# Patient Record
Sex: MALE | Race: WHITE | NOT HISPANIC OR LATINO | URBAN - METROPOLITAN AREA
[De-identification: names, ages, dates, MRNs, and addresses within clinical notes are randomized per-mention and may not be internally consistent; named-entity substitution may affect disease eponyms.]

---

## 2016-04-05 LAB
LEFT VENTRICULAR EJECTION FRACTION HIGH VALUE: 50 %
LEFT VENTRICULAR EJECTION FRACTION MODE: NORMAL
LV EF: 45 %

## 2018-01-29 PROBLEM — J41.1 MUCOPURULENT CHRONIC BRONCHITIS (HCC): Chronic | Status: ACTIVE | Noted: 2018-01-29

## 2018-10-29 ENCOUNTER — APPOINTMENT (RX ONLY)
Dept: URBAN - METROPOLITAN AREA CLINIC 23 | Facility: CLINIC | Age: 73
Setting detail: DERMATOLOGY
End: 2018-10-29

## 2018-10-29 DIAGNOSIS — L21.8 OTHER SEBORRHEIC DERMATITIS: ICD-10-CM

## 2018-10-29 DIAGNOSIS — D18.0 HEMANGIOMA: ICD-10-CM

## 2018-10-29 DIAGNOSIS — D485 NEOPLASM OF UNCERTAIN BEHAVIOR OF SKIN: ICD-10-CM

## 2018-10-29 DIAGNOSIS — L57.0 ACTINIC KERATOSIS: ICD-10-CM

## 2018-10-29 DIAGNOSIS — Z85.828 PERSONAL HISTORY OF OTHER MALIGNANT NEOPLASM OF SKIN: ICD-10-CM

## 2018-10-29 DIAGNOSIS — D69.2 OTHER NONTHROMBOCYTOPENIC PURPURA: ICD-10-CM

## 2018-10-29 PROBLEM — L85.3 XEROSIS CUTIS: Status: ACTIVE | Noted: 2018-10-29

## 2018-10-29 PROBLEM — D48.5 NEOPLASM OF UNCERTAIN BEHAVIOR OF SKIN: Status: ACTIVE | Noted: 2018-10-29

## 2018-10-29 PROBLEM — D18.01 HEMANGIOMA OF SKIN AND SUBCUTANEOUS TISSUE: Status: ACTIVE | Noted: 2018-10-29

## 2018-10-29 PROCEDURE — ? COUNSELING

## 2018-10-29 PROCEDURE — ? DIAGNOSIS COMMENT

## 2018-10-29 PROCEDURE — 99203 OFFICE O/P NEW LOW 30 MIN: CPT | Mod: 25

## 2018-10-29 PROCEDURE — ? OTHER

## 2018-10-29 PROCEDURE — 11100: CPT

## 2018-10-29 PROCEDURE — ? PRESCRIPTION

## 2018-10-29 PROCEDURE — ? BIOPSY BY SHAVE METHOD

## 2018-10-29 RX ORDER — PHARMACY COMPOUNDING ACCESSORY
EACH MISCELLANEOUS
Qty: 1 | Refills: 2 | Status: ERX | COMMUNITY
Start: 2018-10-29

## 2018-10-29 RX ORDER — KETOCONAZOLE 20.5 MG/ML
SHAMPOO, SUSPENSION TOPICAL
Qty: 1 | Refills: 3 | Status: ERX | COMMUNITY
Start: 2018-10-29

## 2018-10-29 RX ORDER — FLUOCINOLONE ACETONIDE 0.11 MG/ML
OIL TOPICAL
Qty: 1 | Refills: 11 | Status: ERX | COMMUNITY
Start: 2018-10-29

## 2018-10-29 RX ADMIN — Medication: at 00:00

## 2018-10-29 RX ADMIN — KETOCONAZOLE: 20.5 SHAMPOO, SUSPENSION TOPICAL at 00:00

## 2018-10-29 RX ADMIN — FLUOCINOLONE ACETONIDE: 0.11 OIL TOPICAL at 00:00

## 2018-10-29 ASSESSMENT — LOCATION DETAILED DESCRIPTION DERM
LOCATION DETAILED: SUPERIOR MID FOREHEAD
LOCATION DETAILED: LEFT LATERAL ABDOMEN
LOCATION DETAILED: RIGHT CENTRAL MALAR CHEEK
LOCATION DETAILED: NASAL DORSUM
LOCATION DETAILED: LEFT SUPERIOR MEDIAL FOREHEAD
LOCATION DETAILED: LEFT MEDIAL MALAR CHEEK
LOCATION DETAILED: LEFT SUPERIOR LATERAL BUCCAL CHEEK
LOCATION DETAILED: LEFT ULNAR DORSAL HAND
LOCATION DETAILED: LEFT SUPERIOR POSTERIOR HELIX
LOCATION DETAILED: LEFT MID-UPPER BACK
LOCATION DETAILED: RIGHT DORSAL WRIST

## 2018-10-29 ASSESSMENT — LOCATION ZONE DERM
LOCATION ZONE: NOSE
LOCATION ZONE: ARM
LOCATION ZONE: FACE
LOCATION ZONE: TRUNK
LOCATION ZONE: EAR
LOCATION ZONE: HAND

## 2018-10-29 ASSESSMENT — LOCATION SIMPLE DESCRIPTION DERM
LOCATION SIMPLE: NOSE
LOCATION SIMPLE: LEFT EAR
LOCATION SIMPLE: LEFT HAND
LOCATION SIMPLE: ABDOMEN
LOCATION SIMPLE: LEFT CHEEK
LOCATION SIMPLE: RIGHT CHEEK
LOCATION SIMPLE: SUPERIOR FOREHEAD
LOCATION SIMPLE: RIGHT WRIST
LOCATION SIMPLE: LEFT UPPER BACK
LOCATION SIMPLE: LEFT FOREHEAD

## 2018-10-29 NOTE — PROCEDURE: BIOPSY BY SHAVE METHOD
Dressing: bandage
Biopsy Type: H and E
Destruction After The Procedure: No
Was A Bandage Applied: Yes
Notification Instructions: Patient will be notified of biopsy results. However, patient instructed to call the office if not contacted within 2 weeks.
Path Notes (To The Dermatopathologist): .
Silver Nitrate Text: The wound bed was treated with silver nitrate after the biopsy was performed.
Billing Type: Third-Party Bill
Curettage Text: The wound bed was treated with curettage after the biopsy was performed.
Depth Of Biopsy: dermis
Hemostasis: Aluminum Chloride
Type Of Destruction Used: Curettage
Electrodesiccation Text: The wound bed was treated with electrodesiccation after the biopsy was performed.
Additional Anesthesia Volume In Cc (Will Not Render If 0): 0
Consent: Written consent was obtained and risks were reviewed including but not limited to scarring, infection, bleeding, scabbing, incomplete removal, nerve damage and allergy to anesthesia.
Wound Care: Vaseline
Cryotherapy Text: The wound bed was treated with cryotherapy after the biopsy was performed.
Electrodesiccation And Curettage Text: The wound bed was treated with electrodesiccation and curettage after the biopsy was performed.
Anesthesia Type: 1% lidocaine with epinephrine
Post-Care Instructions: I reviewed with the patient in detail post-care instructions. Patient is to keep the biopsy site dry overnight, and then apply bacitracin twice daily until healed. Patient may apply hydrogen peroxide soaks to remove any crusting.
Anesthesia Volume In Cc: 0.5
Biopsy Method: Dermablade
Detail Level: Detailed
Path Notes Override (Will Replace All Of The Above Text): Specimen(s) sent to [facility] for testing

## 2018-10-29 NOTE — PROCEDURE: DIAGNOSIS COMMENT
Detail Level: Simple
Comment: Pt is to continue ketoconazole given previously. Will address this next visit after treating aks with Efudex/dovonex.

## 2018-10-29 NOTE — PROCEDURE: OTHER
Note Text (......Xxx Chief Complaint.): This diagnosis correlates with the
Detail Level: Zone
Other (Free Text): Pt has several [facility] treated in the past ( [place])
Other (Free Text): Efudex/dovonex sent to [facility]

## 2018-12-20 ENCOUNTER — APPOINTMENT (RX ONLY)
Dept: URBAN - METROPOLITAN AREA CLINIC 24 | Facility: CLINIC | Age: 73
Setting detail: DERMATOLOGY
End: 2018-12-20

## 2018-12-20 PROBLEM — C44.319 BASAL CELL CARCINOMA OF SKIN OF OTHER PARTS OF FACE: Status: ACTIVE | Noted: 2018-12-20

## 2018-12-20 PROCEDURE — ? MOHS SURGERY

## 2018-12-20 PROCEDURE — 17311 MOHS 1 STAGE H/N/HF/G: CPT

## 2018-12-20 PROCEDURE — 17312 MOHS ADDL STAGE: CPT

## 2018-12-20 PROCEDURE — 12052 INTMD RPR FACE/MM 2.6-5.0 CM: CPT

## 2018-12-20 NOTE — PROCEDURE: MOHS SURGERY
Tumor Debulked?: curette
Crescentic Intermediate Repair Preamble Text (Leave Blank If You Do Not Want): Undermining was performed with blunt dissection.
Bi-Rhombic Flap Text: The defect edges were debeveled with a #15 scalpel blade.  Given the location of the defect and the proximity to free margins a bi-rhombic flap was deemed most appropriate.  Using a sterile surgical marker, an appropriate rhombic flap was drawn incorporating the defect. The area thus outlined was incised deep to adipose tissue with a #15 scalpel blade.  The skin margins were undermined to an appropriate distance in all directions utilizing iris scissors.
Mastoid Interpolation Flap Text: A decision was made to reconstruct the defect utilizing an interpolation axial flap and a staged reconstruction.  A telfa template was made of the defect.  This telfa template was then used to outline the mastoid interpolation flap.  The donor area for the pedicle flap was then injected with anesthesia.  The flap was excised through the skin and subcutaneous tissue down to the layer of the underlying musculature.  The pedicle flap was carefully excised within this deep plane to maintain its blood supply.  The edges of the donor site were undermined.   The donor site was closed in a primary fashion.  The pedicle was then rotated into position and sutured.  Once the tube was sutured into place, adequate blood supply was confirmed with blanching and refill.  The pedicle was then wrapped with xeroform gauze and dressed appropriately with a telfa and gauze bandage to ensure continued blood supply and protect the attached pedicle.
Advancement-Rotation Flap Text: The defect edges were debeveled with a #15 scalpel blade.  Given the location of the defect, shape of the defect and the proximity to free margins an advancement-rotation flap was deemed most appropriate.  Using a sterile surgical marker, an appropriate flap was drawn incorporating the defect and placing the expected incisions within the relaxed skin tension lines where possible. The area thus outlined was incised deep to adipose tissue with a #15 scalpel blade.  The skin margins were undermined to an appropriate distance in all directions utilizing iris scissors.
Quadrant Reporting?: no
Stage 12: Additional Anesthesia Volume In Cc: 0
Composite Graft Text: The defect edges were debeveled with a #15 scalpel blade.  Given the location of the defect, shape of the defect, the proximity to free margins and the fact the defect was full thickness a composite graft was deemed most appropriate.  The defect was outline and then transferred to the donor site.  A full thickness graft was then excised from the donor site. The graft was then placed in the primary defect, oriented appropriately and then sutured into place.  The secondary defect was then repaired using a primary closure.
Show Biopsy Photo Reviewed Variable: Yes
Inflammation Suggestive Of Cancer Camouflage Histology Text: There was a dense lymphocytic infiltrate which prevented adequate histologic evaluation of adjacent structures.
Oculoplastic Surgeon Procedure Text (A): After obtaining clear surgical margins the patient was sent to oculoplastics for surgical repair.  The patient understands they will receive post-surgical care and follow-up from the referring physician's office.
Complex Repair Preamble Text (Leave Blank If You Do Not Want): Extensive wide undermining was performed.
Manual Repair Warning Statement: We plan on removing the manually selected variable below in favor of our much easier automatic structured text blocks found in the previous tab. We decided to do this to help make the flow better and give you the full power of structured data. Manual selection is never going to be ideal in our platform and I would encourage you to avoid using manual selection from this point on, especially since I will be sunsetting this feature. It is important that you do one of two things with the customized text below. First, you can save all of the text in a word file so you can have it for future reference. Second, transfer the text to the appropriate area in the Library tab. Lastly, if there is a flap or graft type which we do not have you need to let us know right away so I can add it in before the variable is hidden. No need to panic, we plan to give you roughly 6 months to make the change.
Subsequent Stages Histo Method Verbiage: Using a similar technique to that described above, a thin layer of tissue was removed from all areas where tumor was visible on the previous stage.  The tissue was again oriented, mapped, dyed, and processed as above.
Paramedian Forehead Flap Text: A decision was made to reconstruct the defect utilizing an interpolation axial flap and a staged reconstruction.  A telfa template was made of the defect.  This telfa template was then used to outline the paramedian forehead pedicle flap.  The donor area for the pedicle flap was then injected with anesthesia.  The flap was excised through the skin and subcutaneous tissue down to the layer of the underlying musculature.  The pedicle flap was carefully excised within this deep plane to maintain its blood supply.  The edges of the donor site were undermined.   The donor site was closed in a primary fashion.  The pedicle was then rotated into position and sutured.  Once the tube was sutured into place, adequate blood supply was confirmed with blanching and refill.  The pedicle was then wrapped with xeroform gauze and dressed appropriately with a telfa and gauze bandage to ensure continued blood supply and protect the attached pedicle.
Mid-Level Procedure Text (E): After obtaining clear surgical margins the patient was sent to a mid-level provider for surgical repair.  The patient understands they will receive post-surgical care and follow-up from the mid-level provider.
Referred To Plastics For Closure Text (Leave Blank If You Do Not Want): After obtaining clear surgical margins the patient was sent to plastics for surgical repair.  The patient understands they will receive post-surgical care and follow-up from the referring physician's office.
Helical Rim Advancement Flap Text: The defect edges were debeveled with a #15 blade scalpel.  Given the location of the defect and the proximity to free margins (helical rim) a double helical rim advancement flap was deemed most appropriate.  Using a sterile surgical marker, the appropriate advancement flaps were drawn incorporating the defect and placing the expected incisions between the helical rim and antihelix where possible.  The area thus outlined was incised through and through with a #15 scalpel blade.  With a skin hook and iris scissors, the flaps were gently and sharply undermined and freed up.
Unna Boot Text: An Unna boot was placed to help immobilize the limb and facilitate more rapid healing.
Banner Transposition Flap Text: The defect edges were debeveled with a #15 scalpel blade.  Given the location of the defect and the proximity to free margins a Banner transposition flap was deemed most appropriate.  Using a sterile surgical marker, an appropriate flap drawn around the defect. The area thus outlined was incised deep to adipose tissue with a #15 scalpel blade.  The skin margins were undermined to an appropriate distance in all directions utilizing iris scissors.
Repair Performed By Another Provider Text (Leave Blank If You Do Not Want): After obtaining clear surgical margins the defect was repaired by another provider.
Epidermal Sutures: 6-0 Prolene
Z Plasty Text: The lesion was extirpated to the level of the fat with a #15 scalpel blade.  Given the location of the defect, shape of the defect and the proximity to free margins a Z-plasty was deemed most appropriate for repair.  Using a sterile surgical marker, the appropriate transposition arms of the Z-plasty were drawn incorporating the defect and placing the expected incisions within the relaxed skin tension lines where possible.    The area thus outlined was incised deep to adipose tissue with a #15 scalpel blade.  The skin margins were undermined to an appropriate distance in all directions utilizing iris scissors.  The opposing transposition arms were then transposed into place in opposite direction and anchored with interrupted buried subcutaneous sutures.
Split-Thickness Skin Graft Text: The defect edges were debeveled with a #15 scalpel blade.  Given the location of the defect, shape of the defect and the proximity to free margins a split thickness skin graft was deemed most appropriate.  Using a sterile surgical marker, the primary defect shape was transferred to the donor site. The split thickness graft was then harvested.  The skin graft was then placed in the primary defect and oriented appropriately.
Purse String (Simple) Text: Given the location of the defect and the characteristics of the surrounding skin a pursestring closure was deemed most appropriate.  Undermining was performed circumfirentially around the surgical defect.  A purstring suture was then placed and tightened.
Referred To Asc For Closure Text (Leave Blank If You Do Not Want): After obtaining clear surgical margins the patient was sent to an ASC for surgical repair.  The patient understands they will receive post-surgical care and follow-up from the ASC physician.
Wound Care (No Sutures): Petrolatum
Consent (Lip)/Introductory Paragraph: The rationale for Mohs was explained to the patient and consent was obtained. The risks, benefits and alternatives to therapy were discussed in detail. Specifically, the risks of lip deformity, changes in the oral aperture, infection, scarring, bleeding, prolonged wound healing, incomplete removal, allergy to anesthesia, nerve injury and recurrence were addressed. Prior to the procedure, the treatment site was clearly identified and confirmed by the patient. All components of Universal Protocol/PAUSE Rule completed.
Simple / Intermediate / Complex Repair - Final Wound Length In Cm: 3.4
Melolabial Transposition Flap Text: The defect edges were debeveled with a #15 scalpel blade.  Given the location of the defect and the proximity to free margins a melolabial flap was deemed most appropriate.  Using a sterile surgical marker, an appropriate melolabial transposition flap was drawn incorporating the defect.    The area thus outlined was incised deep to adipose tissue with a #15 scalpel blade.  The skin margins were undermined to an appropriate distance in all directions utilizing iris scissors.
Melolabial Interpolation Flap Text: A decision was made to reconstruct the defect utilizing an interpolation axial flap and a staged reconstruction.  A telfa template was made of the defect.  This telfa template was then used to outline the melolabial interpolation flap.  The donor area for the pedicle flap was then injected with anesthesia.  The flap was excised through the skin and subcutaneous tissue down to the layer of the underlying musculature.  The pedicle flap was carefully excised within this deep plane to maintain its blood supply.  The edges of the donor site were undermined.   The donor site was closed in a primary fashion.  The pedicle was then rotated into position and sutured.  Once the tube was sutured into place, adequate blood supply was confirmed with blanching and refill.  The pedicle was then wrapped with xeroform gauze and dressed appropriately with a telfa and gauze bandage to ensure continued blood supply and protect the attached pedicle.
Complex Repair And Flap Additional Text (Will Appearing After The Standard Complex Repair Text): The complex repair was not sufficient to completely close the primary defect. The remaining additional defect was repaired with the flap mentioned below.
Stage 9: Additional Anesthesia Type: 1% lidocaine with epinephrine
Burow's Advancement Flap Text: The defect edges were debeveled with a #15 scalpel blade.  Given the location of the defect and the proximity to free margins a Burow's advancement flap was deemed most appropriate.  Using a sterile surgical marker, the appropriate advancement flap was drawn incorporating the defect and placing the expected incisions within the relaxed skin tension lines where possible.    The area thus outlined was incised deep to adipose tissue with a #15 scalpel blade.  The skin margins were undermined to an appropriate distance in all directions utilizing iris scissors.
Home Suture Removal Text: Patient was provided instructions on removing sutures and will remove their sutures at home.  If they have any questions or difficulties they will call the office.
Detail Level: Detailed
Anesthesia Volume In Cc: 4
A-T Advancement Flap Text: The defect edges were debeveled with a #15 scalpel blade.  Given the location of the defect, shape of the defect and the proximity to free margins an A-T advancement flap was deemed most appropriate.  Using a sterile surgical marker, an appropriate advancement flap was drawn incorporating the defect and placing the expected incisions within the relaxed skin tension lines where possible.    The area thus outlined was incised deep to adipose tissue with a #15 scalpel blade.  The skin margins were undermined to an appropriate distance in all directions utilizing iris scissors.
Closure 4 Information: This tab is for additional flaps and grafts above and beyond our usual structured repairs.  Please note if you enter information here it will not currently bill and you will need to add the billing information manually.
No Residual Tumor Seen Histology Text: There were no malignant cells seen in the sections examined.
Purse String (Intermediate) Text: Given the location of the defect and the characteristics of the surrounding skin a pursestring intermediate closure was deemed most appropriate.  Undermining was performed circumfirentially around the surgical defect.  A purstring suture was then placed and tightened.
Anesthesia Volume In Cc: 2
Star Wedge Flap Text: The defect edges were debeveled with a #15 scalpel blade.  Given the location of the defect, shape of the defect and the proximity to free margins a star wedge flap was deemed most appropriate.  Using a sterile surgical marker, an appropriate rotation flap was drawn incorporating the defect and placing the expected incisions within the relaxed skin tension lines where possible. The area thus outlined was incised deep to adipose tissue with a #15 scalpel blade.  The skin margins were undermined to an appropriate distance in all directions utilizing iris scissors.
Otolaryngologist Procedure Text (E): After obtaining clear surgical margins the patient was sent to otolaryngology for surgical repair.  The patient understands they will receive post-surgical care and follow-up from the referring physician's office.
Ear Star Wedge Flap Text: The defect edges were debeveled with a #15 blade scalpel.  Given the location of the defect and the proximity to free margins (helical rim) an ear star wedge flap was deemed most appropriate.  Using a sterile surgical marker, the appropriate flap was drawn incorporating the defect and placing the expected incisions between the helical rim and antihelix where possible.  The area thus outlined was incised through and through with a #15 scalpel blade.
Repair Anesthesia Method: local infiltration
Transposition Flap Text: The defect edges were debeveled with a #15 scalpel blade.  Given the location of the defect and the proximity to free margins a transposition flap was deemed most appropriate.  Using a sterile surgical marker, an appropriate transposition flap was drawn incorporating the defect.    The area thus outlined was incised deep to adipose tissue with a #15 scalpel blade.  The skin margins were undermined to an appropriate distance in all directions utilizing iris scissors.
Keystone Flap Text: The defect edges were debeveled with a #15 scalpel blade.  Given the location of the defect, shape of the defect a keystone flap was deemed most appropriate.  Using a sterile surgical marker, an appropriate keystone flap was drawn incorporating the defect, outlining the appropriate donor tissue and placing the expected incisions within the relaxed skin tension lines where possible. The area thus outlined was incised deep to adipose tissue with a #15 scalpel blade.  The skin margins were undermined to an appropriate distance in all directions around the primary defect and laterally outward around the flap utilizing iris scissors.
No Repair - Repaired With Adjacent Surgical Defect Text (Leave Blank If You Do Not Want): After obtaining clear surgical margins the defect was repaired concurrently with another surgical defect which was in close approximation.
Mucosal Advancement Flap Text: Given the location of the defect, shape of the defect and the proximity to free margins a mucosal advancement flap was deemed most appropriate. Incisions were made with a 15 blade scalpel in the appropriate fashion along the cutaneous vermillion border and the mucosal lip. The remaining actinically damaged mucosal tissue was excised.  The mucosal advancement flap was then elevated to the gingival sulcus with care taken to preserve the neurovascular structures and advanced into the primary defect. Care was taken to ensure that precise realignment of the vermillion border was achieved.
Advancement Flap (Single) Text: The defect edges were debeveled with a #15 scalpel blade.  Given the location of the defect and the proximity to free margins a single advancement flap was deemed most appropriate.  Using a sterile surgical marker, an appropriate advancement flap was drawn incorporating the defect and placing the expected incisions within the relaxed skin tension lines where possible.    The area thus outlined was incised deep to adipose tissue with a #15 scalpel blade.  The skin margins were undermined to an appropriate distance in all directions utilizing iris scissors.
Donor Site Anesthesia Type: same as repair anesthesia
V-Y Flap Text: The defect edges were debeveled with a #15 scalpel blade.  Given the location of the defect, shape of the defect and the proximity to free margins a V-Y flap was deemed most appropriate.  Using a sterile surgical marker, an appropriate advancement flap was drawn incorporating the defect and placing the expected incisions within the relaxed skin tension lines where possible.    The area thus outlined was incised deep to adipose tissue with a #15 scalpel blade.  The skin margins were undermined to an appropriate distance in all directions utilizing iris scissors.
Cheiloplasty (Complex) Text: A decision was made to reconstruct the defect with a  cheiloplasty.  The defect was undermined extensively.  Additional obicularis oris muscle was excised with a 15 blade scalpel.  The defect was converted into a full thickness wedge to facilite a better cosmetic result.  Small vessels were then tied off with 5-0 monocyrl. The obicularis oris, superficial fascia, adipose and dermis were then reapproximated.  After the deeper layers were approximated the epidermis was reapproximated with particular care given to realign the vermillion border.
Graft Donor Site Dermal Sutures (Optional): 5-0 PDS
Consent 3/Introductory Paragraph: I gave the patient a chance to ask questions they had about the procedure.  Following this I explained the Mohs procedure and consent was obtained. The risks, benefits and alternatives to therapy were discussed in detail. Specifically, the risks of infection, scarring, bleeding, prolonged wound healing, incomplete removal, allergy to anesthesia, nerve injury and recurrence were addressed. Prior to the procedure, the treatment site was clearly identified and confirmed by the patient. All components of Universal Protocol/PAUSE Rule completed.
Xenograft Text: The defect edges were debeveled with a #15 scalpel blade.  Given the location of the defect, shape of the defect and the proximity to free margins a xenograft was deemed most appropriate.  The graft was then trimmed to fit the size of the defect.  The graft was then placed in the primary defect and oriented appropriately.
Cheek Interpolation Flap Text: A decision was made to reconstruct the defect utilizing an interpolation axial flap and a staged reconstruction.  A telfa template was made of the defect.  This telfa template was then used to outline the Cheek Interpolation flap.  The donor area for the pedicle flap was then injected with anesthesia.  The flap was excised through the skin and subcutaneous tissue down to the layer of the underlying musculature.  The interpolation flap was carefully excised within this deep plane to maintain its blood supply.  The edges of the donor site were undermined.   The donor site was closed in a primary fashion.  The pedicle was then rotated into position and sutured.  Once the tube was sutured into place, adequate blood supply was confirmed with blanching and refill.  The pedicle was then wrapped with xeroform gauze and dressed appropriately with a telfa and gauze bandage to ensure continued blood supply and protect the attached pedicle.
Mauc Instructions: By selecting yes to the question below the MAUC number will be added into the note.  This will be calculated automatically based on the diagnosis chosen, the size entered, the body zone selected (H,M,L) and the specific indications you chose. You will also have the option to override the Mohs AUC if you disagree with the automatically calculated number and this option is found in the Case Summary tab.
O-T Plasty Text: The defect edges were debeveled with a #15 scalpel blade.  Given the location of the defect, shape of the defect and the proximity to free margins an O-T plasty was deemed most appropriate.  Using a sterile surgical marker, an appropriate O-T plasty was drawn incorporating the defect and placing the expected incisions within the relaxed skin tension lines where possible.    The area thus outlined was incised deep to adipose tissue with a #15 scalpel blade.  The skin margins were undermined to an appropriate distance in all directions utilizing iris scissors.
V-Y Plasty Text: The defect edges were debeveled with a #15 scalpel blade.  Given the location of the defect, shape of the defect and the proximity to free margins an V-Y advancement flap was deemed most appropriate.  Using a sterile surgical marker, an appropriate advancement flap was drawn incorporating the defect and placing the expected incisions within the relaxed skin tension lines where possible.    The area thus outlined was incised deep to adipose tissue with a #15 scalpel blade.  The skin margins were undermined to an appropriate distance in all directions utilizing iris scissors.
Epidermal Autograft Text: The defect edges were debeveled with a #15 scalpel blade.  Given the location of the defect, shape of the defect and the proximity to free margins an epidermal autograft was deemed most appropriate.  Using a sterile surgical marker, the primary defect shape was transferred to the donor site. The epidermal graft was then harvested.  The skin graft was then placed in the primary defect and oriented appropriately.
Eye Protection Verbiage: Before proceeding with the stage, a plastic scleral shield was inserted. The globe was anesthetized with a few drops of 1% lidocaine with 1:100,000 epinephrine. Then, an appropriate sized scleral shield was chosen and coated with lacrilube ointment. The shield was gently inserted and left in place for the duration of each stage. After the stage was completed, the shield was gently removed.
Consent (Ear)/Introductory Paragraph: The rationale for Mohs was explained to the patient and consent was obtained. The risks, benefits and alternatives to therapy were discussed in detail. Specifically, the risks of ear deformity, infection, scarring, bleeding, prolonged wound healing, incomplete removal, allergy to anesthesia, nerve injury and recurrence were addressed. Prior to the procedure, the treatment site was clearly identified and confirmed by the patient. All components of Universal Protocol/PAUSE Rule completed.
Spiral Flap Text: The defect edges were debeveled with a #15 scalpel blade.  Given the location of the defect, shape of the defect and the proximity to free margins a spiral flap was deemed most appropriate.  Using a sterile surgical marker, an appropriate rotation flap was drawn incorporating the defect and placing the expected incisions within the relaxed skin tension lines where possible. The area thus outlined was incised deep to adipose tissue with a #15 scalpel blade.  The skin margins were undermined to an appropriate distance in all directions utilizing iris scissors.
Posterior Auricular Interpolation Flap Text: A decision was made to reconstruct the defect utilizing an interpolation axial flap and a staged reconstruction.  A telfa template was made of the defect.  This telfa template was then used to outline the posterior auricular interpolation flap.  The donor area for the pedicle flap was then injected with anesthesia.  The flap was excised through the skin and subcutaneous tissue down to the layer of the underlying musculature.  The pedicle flap was carefully excised within this deep plane to maintain its blood supply.  The edges of the donor site were undermined.   The donor site was closed in a primary fashion.  The pedicle was then rotated into position and sutured.  Once the tube was sutured into place, adequate blood supply was confirmed with blanching and refill.  The pedicle was then wrapped with xeroform gauze and dressed appropriately with a telfa and gauze bandage to ensure continued blood supply and protect the attached pedicle.
Bcc Infiltrative Histology Text: There were numerous aggregates of basaloid cells demonstrating an infiltrative pattern.
Anesthesia Type: 1% lidocaine without epinephrine
Mohs Method Verbiage: An incision at a 45 degree angle following the standard Mohs approach was done and the specimen was harvested as a microscopic controlled layer.
Consent (Marginal Mandibular)/Introductory Paragraph: The rationale for Mohs was explained to the patient and consent was obtained. The risks, benefits and alternatives to therapy were discussed in detail. Specifically, the risks of damage to the marginal mandibular branch of the facial nerve, infection, scarring, bleeding, prolonged wound healing, incomplete removal, allergy to anesthesia, and recurrence were addressed. Prior to the procedure, the treatment site was clearly identified and confirmed by the patient. All components of Universal Protocol/PAUSE Rule completed.
S Plasty Text: Given the location and shape of the defect, and the orientation of relaxed skin tension lines, an S-plasty was deemed most appropriate for repair.  Using a sterile surgical marker, the appropriate outline of the S-plasty was drawn, incorporating the defect and placing the expected incisions within the relaxed skin tension lines where possible.  The area thus outlined was incised deep to adipose tissue with a #15 scalpel blade.  The skin margins were undermined to an appropriate distance in all directions utilizing iris scissors. The skin flaps were advanced over the defect.  The opposing margins were then approximated with interrupted buried subcutaneous sutures.
Alar Island Pedicle Flap Text: The defect edges were debeveled with a #15 scalpel blade.  Given the location of the defect, shape of the defect and the proximity to the alar rim an island pedicle advancement flap was deemed most appropriate.  Using a sterile surgical marker, an appropriate advancement flap was drawn incorporating the defect, outlining the appropriate donor tissue and placing the expected incisions within the nasal ala running parallel to the alar rim. The area thus outlined was incised with a #15 scalpel blade.  The skin margins were undermined minimally to an appropriate distance in all directions around the primary defect and laterally outward around the island pedicle utilizing iris scissors.  There was minimal undermining beneath the pedicle flap.
Surgeon Performing Repair (Optional): Dr Mcfarlane
Non-Graft Cartilage Fenestration Text: The cartilage was fenestrated with a 2mm punch biopsy to help facilitate healing.
Tarsorrhaphy Text: A tarsorrhaphy was performed using Frost sutures.
Postop Diagnosis: same
Dorsal Nasal Flap Text: The defect edges were debeveled with a #15 scalpel blade.  Given the location of the defect and the proximity to free margins a dorsal nasal flap was deemed most appropriate.  Using a sterile surgical marker, an appropriate dorsal nasal flap was drawn around the defect.    The area thus outlined was incised deep to adipose tissue with a #15 scalpel blade.  The skin margins were undermined to an appropriate distance in all directions utilizing iris scissors.
Cartilage Graft Text: The defect edges were debeveled with a #15 scalpel blade.  Given the location of the defect, shape of the defect, the fact the defect involved a full thickness cartilage defect a cartilage graft was deemed most appropriate.  An appropriate donor site was identified, cleansed, and anesthetized. The cartilage graft was then harvested and transferred to the recipient site, oriented appropriately and then sutured into place.  The secondary defect was then repaired using a primary closure.
Alternatives Discussed Intro (Do Not Add Period): I discussed alternative treatments to Mohs surgery and specifically discussed the risks and benefits of
Initial Size Of Lesion: 0.5
Full Thickness Lip Wedge Repair (Flap) Text: Given the location of the defect and the proximity to free margins a full thickness wedge repair was deemed most appropriate.  Using a sterile surgical marker, the appropriate repair was drawn incorporating the defect and placing the expected incisions perpendicular to the vermillion border.  The vermillion border was also meticulously outlined to ensure appropriate reapproximation during the repair.  The area thus outlined was incised through and through with a #15 scalpel blade.  The muscularis and dermis were reaproximated with deep sutures following hemostasis. Care was taken to realign the vermillion border before proceeding with the superficial closure.  Once the vermillion was realigned the superfical and mucosal closure was finished.
Island Pedicle Flap With Canthal Suspension Text: The defect edges were debeveled with a #15 scalpel blade.  Given the location of the defect, shape of the defect and the proximity to free margins an island pedicle advancement flap was deemed most appropriate.  Using a sterile surgical marker, an appropriate advancement flap was drawn incorporating the defect, outlining the appropriate donor tissue and placing the expected incisions within the relaxed skin tension lines where possible. The area thus outlined was incised deep to adipose tissue with a #15 scalpel blade.  The skin margins were undermined to an appropriate distance in all directions around the primary defect and laterally outward around the island pedicle utilizing iris scissors.  There was minimal undermining beneath the pedicle flap. A suspension suture was placed in the canthal tendon to prevent tension and prevent ectropion.
Cheiloplasty (Less Than 50%) Text: A decision was made to reconstruct the defect with a  cheiloplasty.  The defect was undermined extensively.  Additional obicularis oris muscle was excised with a 15 blade scalpel.  The defect was converted into a full thickness wedge, of less than 50% of the vertical height of the lip, to facilite a better cosmetic result.  Small vessels were then tied off with 5-0 monocyrl. The obicularis oris, superficial fascia, adipose and dermis were then reapproximated.  After the deeper layers were approximated the epidermis was reapproximated with particular care given to realign the vermillion border.
Consent (Spinal Accessory)/Introductory Paragraph: The rationale for Mohs was explained to the patient and consent was obtained. The risks, benefits and alternatives to therapy were discussed in detail. Specifically, the risks of damage to the spinal accessory nerve, infection, scarring, bleeding, prolonged wound healing, incomplete removal, allergy to anesthesia, and recurrence were addressed. Prior to the procedure, the treatment site was clearly identified and confirmed by the patient. All components of Universal Protocol/PAUSE Rule completed.
Location Indication Override (Is Already Calculated Based On Selected Body Location): Area M
Dressing: dry sterile dressing
Anesthesia Type: 1% lidocaine with 1:200,000 epinephrine and a 1:10 solution of 8.4% sodium bicarbonate
Same Histology In Subsequent Stages Text: The pattern and morphology of the tumor is as described in the first stage.
Rotation Flap Text: The defect edges were debeveled with a #15 scalpel blade.  Given the location of the defect, shape of the defect and the proximity to free margins a rotation flap was deemed most appropriate.  Using a sterile surgical marker, an appropriate rotation flap was drawn incorporating the defect and placing the expected incisions within the relaxed skin tension lines where possible.    The area thus outlined was incised deep to adipose tissue with a #15 scalpel blade.  The skin margins were undermined to an appropriate distance in all directions utilizing iris scissors.
Consent (Scalp)/Introductory Paragraph: The rationale for Mohs was explained to the patient and consent was obtained. The risks, benefits and alternatives to therapy were discussed in detail. Specifically, the risks of changes in hair growth pattern secondary to repair, infection, scarring, bleeding, prolonged wound healing, incomplete removal, allergy to anesthesia, nerve injury and recurrence were addressed. Prior to the procedure, the treatment site was clearly identified and confirmed by the patient. All components of Universal Protocol/PAUSE Rule completed.
Surgeon/Pathologist Verbiage (Will Incorporate Name Of Surgeon From Intro If Not Blank): operated in two distinct and integrated capacities as the surgeon and pathologist.
Trilobed Flap Text: The defect edges were debeveled with a #15 scalpel blade.  Given the location of the defect and the proximity to free margins a trilobed flap was deemed most appropriate.  Using a sterile surgical marker, an appropriate trilobed flap drawn around the defect.    The area thus outlined was incised deep to adipose tissue with a #15 scalpel blade.  The skin margins were undermined to an appropriate distance in all directions utilizing iris scissors.
Rhombic Flap Text: The defect edges were debeveled with a #15 scalpel blade.  Given the location of the defect and the proximity to free margins a rhombic flap was deemed most appropriate.  Using a sterile surgical marker, an appropriate rhombic flap was drawn incorporating the defect.    The area thus outlined was incised deep to adipose tissue with a #15 scalpel blade.  The skin margins were undermined to an appropriate distance in all directions utilizing iris scissors.
Stage 2: Additional Anesthesia Type: 1% lidocaine with 1:100,000 epinephrine
Partial Purse String (Intermediate) Text: Given the location of the defect and the characteristics of the surrounding skin an intermediate purse string closure was deemed most appropriate.  Undermining was performed circumfirentially around the surgical defect.  A purse string suture was then placed and tightened. Wound tension only allowed a partial closure of the circular defect.
Surgeon: Gerry baron
Area H Indication Text: Tumors in this location are included in Area H (eyelids, eyebrows, nose, lips, chin, ear, pre-auricular, post-auricular, temple, genitalia, hands, feet, ankles and areola).  Tissue conservation is critical in these anatomic locations.
Bilateral Helical Rim Advancement Flap Text: The defect edges were debeveled with a #15 blade scalpel.  Given the location of the defect and the proximity to free margins (helical rim) a bilateral helical rim advancement flap was deemed most appropriate.  Using a sterile surgical marker, the appropriate advancement flaps were drawn incorporating the defect and placing the expected incisions between the helical rim and antihelix where possible.  The area thus outlined was incised through and through with a #15 scalpel blade.  With a skin hook and iris scissors, the flaps were gently and sharply undermined and freed up.
Ftsg Text: The defect edges were debeveled with a #15 scalpel blade.  Given the location of the defect, shape of the defect and the proximity to free margins a full thickness skin graft was deemed most appropriate.  Using a sterile surgical marker, the primary defect shape was transferred to the donor site. The area thus outlined was incised deep to adipose tissue with a #15 scalpel blade.  The harvested graft was then trimmed of adipose tissue until only dermis and epidermis was left.  The skin margins of the secondary defect were undermined to an appropriate distance in all directions utilizing iris scissors.  The secondary defect was closed with interrupted buried subcutaneous sutures.  The skin edges were then re-apposed with running  sutures.  The skin graft was then placed in the primary defect and oriented appropriately.
Bcc Histology Text: There were numerous aggregates of basaloid cells.
Complex Repair And Graft Additional Text (Will Appearing After The Standard Complex Repair Text): The complex repair was not sufficient to completely close the primary defect. The remaining additional defect was repaired with the graft mentioned below.
Partial Purse String (Simple) Text: Given the location of the defect and the characteristics of the surrounding skin a simple purse string closure was deemed most appropriate.  Undermining was performed circumfirentially around the surgical defect.  A purse string suture was then placed and tightened. Wound tension only allowed a partial closure of the circular defect.
Skin Substitute Text: The defect edges were debeveled with a #15 scalpel blade.  Given the location of the defect, shape of the defect and the proximity to free margins a skin substitute graft was deemed most appropriate.  The graft material was trimmed to fit the size of the defect. The graft was then placed in the primary defect and oriented appropriately.
Area L Indication Text: Tumors in this location are included in Area L (trunk and extremities).  Mohs surgery is indicated for larger tumors, 2 cm or larger, in these anatomic locations.
Post-Care Instructions: I reviewed with the patient in detail post-care instructions. Patient is not to engage in any heavy lifting, exercise, or swimming for the next 14 days. Should the patient develop any fevers, chills, bleeding, severe pain patient will contact the office immediately..\\n.
Hemostasis: Electrocautery
Information: Selecting Yes will display possible errors in your note based on the variables you have selected. This validation is only offered as a suggestion for you. PLEASE NOTE THAT THE VALIDATION TEXT WILL BE REMOVED WHEN YOU FINALIZE YOUR NOTE. IF YOU WANT TO FAX A PRELIMINARY NOTE YOU WILL NEED TO TOGGLE THIS TO 'NO' IF YOU DO NOT WANT IT IN YOUR FAXED NOTE.
Dermal Autograft Text: The defect edges were debeveled with a #15 scalpel blade.  Given the location of the defect, shape of the defect and the proximity to free margins a dermal autograft was deemed most appropriate.  Using a sterile surgical marker, the primary defect shape was transferred to the donor site. The area thus outlined was incised deep to adipose tissue with a #15 scalpel blade.  The harvested graft was then trimmed of adipose and epidermal tissue until only dermis was left.  The skin graft was then placed in the primary defect and oriented appropriately.
Medical Necessity Statement: Based on my medical judgement, Mohs surgery is the most appropriate treatment for this cancer compared to other treatments.
W Plasty Text: The lesion was extirpated to the level of the fat with a #15 scalpel blade.  Given the location of the defect, shape of the defect and the proximity to free margins a W-plasty was deemed most appropriate for repair.  Using a sterile surgical marker, the appropriate transposition arms of the W-plasty were drawn incorporating the defect and placing the expected incisions within the relaxed skin tension lines where possible.    The area thus outlined was incised deep to adipose tissue with a #15 scalpel blade.  The skin margins were undermined to an appropriate distance in all directions utilizing iris scissors.  The opposing transposition arms were then transposed into place in opposite direction and anchored with interrupted buried subcutaneous sutures.
Modified Advancement Flap Text: The defect edges were debeveled with a #15 scalpel blade.  Given the location of the defect, shape of the defect and the proximity to free margins a modified advancement flap was deemed most appropriate.  Using a sterile surgical marker, an appropriate advancement flap was drawn incorporating the defect and placing the expected incisions within the relaxed skin tension lines where possible.    The area thus outlined was incised deep to adipose tissue with a #15 scalpel blade.  The skin margins were undermined to an appropriate distance in all directions utilizing iris scissors.
Consent (Temporal Branch)/Introductory Paragraph: The rationale for Mohs was explained to the patient and consent was obtained. The risks, benefits and alternatives to therapy were discussed in detail. Specifically, the risks of damage to the temporal branch of the facial nerve, infection, scarring, bleeding, prolonged wound healing, incomplete removal, allergy to anesthesia, and recurrence were addressed. Prior to the procedure, the treatment site was clearly identified and confirmed by the patient. All components of Universal Protocol/PAUSE Rule completed.
Mohs Case Number: [id number]
Muscle Hinge Flap Text: The defect edges were debeveled with a #15 scalpel blade.  Given the size, depth and location of the defect and the proximity to free margins a muscle hinge flap was deemed most appropriate.  Using a sterile surgical marker, an appropriate hinge flap was drawn incorporating the defect. The area thus outlined was incised with a #15 scalpel blade.  The skin margins were undermined to an appropriate distance in all directions utilizing iris scissors.
H Plasty Text: Given the location of the defect, shape of the defect and the proximity to free margins a H-plasty was deemed most appropriate for repair.  Using a sterile surgical marker, the appropriate advancement arms of the H-plasty were drawn incorporating the defect and placing the expected incisions within the relaxed skin tension lines where possible. The area thus outlined was incised deep to adipose tissue with a #15 scalpel blade. The skin margins were undermined to an appropriate distance in all directions utilizing iris scissors.  The opposing advancement arms were then advanced into place in opposite direction and anchored with interrupted buried subcutaneous sutures.
Consent (Near Eyelid Margin)/Introductory Paragraph: The rationale for Mohs was explained to the patient and consent was obtained. The risks, benefits and alternatives to therapy were discussed in detail. Specifically, the risks of ectropion or eyelid deformity, infection, scarring, bleeding, prolonged wound healing, incomplete removal, allergy to anesthesia, nerve injury and recurrence were addressed. Prior to the procedure, the treatment site was clearly identified and confirmed by the patient. All components of Universal Protocol/PAUSE Rule completed.
Mohs Histo Method Verbiage: The section(s) were then chromacoded and processed in the Mohs lab using the Mohs protocol and submitted for frozen section.
Double Island Pedicle Flap Text: The defect edges were debeveled with a #15 scalpel blade.  Given the location of the defect, shape of the defect and the proximity to free margins a double island pedicle advancement flap was deemed most appropriate.  Using a sterile surgical marker, an appropriate advancement flap was drawn incorporating the defect, outlining the appropriate donor tissue and placing the expected incisions within the relaxed skin tension lines where possible.    The area thus outlined was incised deep to adipose tissue with a #15 scalpel blade.  The skin margins were undermined to an appropriate distance in all directions around the primary defect and laterally outward around the island pedicle utilizing iris scissors.  There was minimal undermining beneath the pedicle flap.
Closure 2 Information: This tab is for additional flaps and grafts, including complex repair and grafts and complex repair and flaps. You can also specify a different location for the additional defect, if the location is the same you do not need to select a new one. We will insert the automated text for the repair you select below just as we do for solitary flaps and grafts. Please note that at this time if you select a location with a different insurance zone you will need to override the ICD10 and CPT if appropriate.
Consent Type: Consent 1 (Standard)
Consent 1/Introductory Paragraph: The rationale for Mohs was explained to the patient and consent was obtained. The risks, benefits and alternatives to therapy were discussed in detail. Specifically, the risks of infection, scarring, bleeding, prolonged wound healing, incomplete removal, allergy to anesthesia, nerve injury and recurrence were addressed. Prior to the procedure, the treatment site was clearly identified and confirmed by the patient. All components of Universal Protocol/PAUSE Rule completed.
Island Pedicle Flap-Requiring Vessel Identification Text: The defect edges were debeveled with a #15 scalpel blade.  Given the location of the defect, shape of the defect and the proximity to free margins an island pedicle advancement flap was deemed most appropriate.  Using a sterile surgical marker, an appropriate advancement flap was drawn, based on the axial vessel mentioned above, incorporating the defect, outlining the appropriate donor tissue and placing the expected incisions within the relaxed skin tension lines where possible.    The area thus outlined was incised deep to adipose tissue with a #15 scalpel blade.  The skin margins were undermined to an appropriate distance in all directions around the primary defect and laterally outward around the island pedicle utilizing iris scissors.  There was minimal undermining beneath the pedicle flap.
Advancement Flap (Double) Text: The defect edges were debeveled with a #15 scalpel blade.  Given the location of the defect and the proximity to free margins a double advancement flap was deemed most appropriate.  Using a sterile surgical marker, the appropriate advancement flaps were drawn incorporating the defect and placing the expected incisions within the relaxed skin tension lines where possible.    The area thus outlined was incised deep to adipose tissue with a #15 scalpel blade.  The skin margins were undermined to an appropriate distance in all directions utilizing iris scissors.
O-T Advancement Flap Text: The defect edges were debeveled with a #15 scalpel blade.  Given the location of the defect, shape of the defect and the proximity to free margins an O-T advancement flap was deemed most appropriate.  Using a sterile surgical marker, an appropriate advancement flap was drawn incorporating the defect and placing the expected incisions within the relaxed skin tension lines where possible.    The area thus outlined was incised deep to adipose tissue with a #15 scalpel blade.  The skin margins were undermined to an appropriate distance in all directions utilizing iris scissors.
Mercedes Flap Text: The defect edges were debeveled with a #15 scalpel blade.  Given the location of the defect, shape of the defect and the proximity to free margins a Mercedes flap was deemed most appropriate.  Using a sterile surgical marker, an appropriate advancement flap was drawn incorporating the defect and placing the expected incisions within the relaxed skin tension lines where possible. The area thus outlined was incised deep to adipose tissue with a #15 scalpel blade.  The skin margins were undermined to an appropriate distance in all directions utilizing iris scissors.
Repair Type: Intermediate Layered Repair
Area M Indication Text: Tumors in this location are included in Area M (cheek, forehead, scalp, neck, jawline and pretibial skin).  Mohs surgery is indicated for tumors 1 cm or larger in these anatomic locations.
Mohs Rapid Report Verbiage: The area of clinically evident tumor was marked with skin marking ink and appropriately hatched.  The initial incision was made following the Mohs approach through the skin.  The specimen was taken to the lab, divided into the necessary number of pieces, chromacoded and processed according to the Mohs protocol.  This was repeated in successive stages until a tumor free defect was achieved.
O-L Flap Text: The defect edges were debeveled with a #15 scalpel blade.  Given the location of the defect, shape of the defect and the proximity to free margins an O-L flap was deemed most appropriate.  Using a sterile surgical marker, an appropriate advancement flap was drawn incorporating the defect and placing the expected incisions within the relaxed skin tension lines where possible.    The area thus outlined was incised deep to adipose tissue with a #15 scalpel blade.  The skin margins were undermined to an appropriate distance in all directions utilizing iris scissors.
Island Pedicle Flap Text: The defect edges were debeveled with a #15 scalpel blade.  Given the location of the defect, shape of the defect and the proximity to free margins an island pedicle advancement flap was deemed most appropriate.  Using a sterile surgical marker, an appropriate advancement flap was drawn incorporating the defect, outlining the appropriate donor tissue and placing the expected incisions within the relaxed skin tension lines where possible.    The area thus outlined was incised deep to adipose tissue with a #15 scalpel blade.  The skin margins were undermined to an appropriate distance in all directions around the primary defect and laterally outward around the island pedicle utilizing iris scissors.  There was minimal undermining beneath the pedicle flap.
Stage 3: Additional Anesthesia Type: 0.5% lidocaine with 1:200,000 epinephrine and a 1:10 solution of 8.4% sodium bicarbonate
Secondary Intention Text (Leave Blank If You Do Not Want): The defect will heal with secondary intention.
Estimated Blood Loss (Cc): minimal
Ear Wedge Repair Text: A wedge excision was completed by carrying down an excision through the full thickness of the ear and cartilage with an inward facing Burow's triangle. The wound was then closed in a layered fashion.
Consent 2/Introductory Paragraph: Mohs surgery was explained to the patient and consent was obtained. The risks, benefits and alternatives to therapy were discussed in detail. Specifically, the risks of infection, scarring, bleeding, prolonged wound healing, incomplete removal, allergy to anesthesia, nerve injury and recurrence were addressed. Prior to the procedure, the treatment site was clearly identified and confirmed by the patient. All components of Universal Protocol/PAUSE Rule completed.
O-Z Plasty Text: The defect edges were debeveled with a #15 scalpel blade.  Given the location of the defect, shape of the defect and the proximity to free margins an O-Z plasty (double transposition flap) was deemed most appropriate.  Using a sterile surgical marker, the appropriate transposition flaps were drawn incorporating the defect and placing the expected incisions within the relaxed skin tension lines where possible.    The area thus outlined was incised deep to adipose tissue with a #15 scalpel blade.  The skin margins were undermined to an appropriate distance in all directions utilizing iris scissors.  Hemostasis was achieved with electrocautery.  The flaps were then transposed into place, one clockwise and the other counterclockwise, and anchored with interrupted buried subcutaneous sutures.
Localized Dermabrasion With Wire Brush Text: The patient was draped in routine manner.  Localized dermabrasion using 3 x 17 mm wire brush was performed in routine manner to papillary dermis. This spot dermabrasion is being performed to complete skin cancer reconstruction. It also will eliminate the other sun damaged precancerous cells that are known to be part of the regional effect of a lifetime's worth of sun exposure. This localized dermabrasion is therapeutic and should not be considered cosmetic in any regard.
Graft Donor Site Bandage (Optional-Leave Blank If You Don't Want In Note): Steri-strips and a pressure bandage were applied to the donor site.
Wound Care: Bacitracin
Cheek-To-Nose Interpolation Flap Text: A decision was made to reconstruct the defect utilizing an interpolation axial flap and a staged reconstruction.  A telfa template was made of the defect.  This telfa template was then used to outline the Cheek-To-Nose Interpolation flap.  The donor area for the pedicle flap was then injected with anesthesia.  The flap was excised through the skin and subcutaneous tissue down to the layer of the underlying musculature.  The interpolation flap was carefully excised within this deep plane to maintain its blood supply.  The edges of the donor site were undermined.   The donor site was closed in a primary fashion.  The pedicle was then rotated into position and sutured.  Once the tube was sutured into place, adequate blood supply was confirmed with blanching and refill.  The pedicle was then wrapped with xeroform gauze and dressed appropriately with a telfa and gauze bandage to ensure continued blood supply and protect the attached pedicle.
Interpolation Flap Text: A decision was made to reconstruct the defect utilizing an interpolation axial flap and a staged reconstruction.  A telfa template was made of the defect.  This telfa template was then used to outline the interpolation flap.  The donor area for the pedicle flap was then injected with anesthesia.  The flap was excised through the skin and subcutaneous tissue down to the layer of the underlying musculature.  The interpolation flap was carefully excised within this deep plane to maintain its blood supply.  The edges of the donor site were undermined.   The donor site was closed in a primary fashion.  The pedicle was then rotated into position and sutured.  Once the tube was sutured into place, adequate blood supply was confirmed with blanching and refill.  The pedicle was then wrapped with xeroform gauze and dressed appropriately with a telfa and gauze bandage to ensure continued blood supply and protect the attached pedicle.
Deep Sutures: 5-0 PolySyn
Tissue Cultured Epidermal Autograft Text: The defect edges were debeveled with a #15 scalpel blade.  Given the location of the defect, shape of the defect and the proximity to free margins a tissue cultured epidermal autograft was deemed most appropriate.  The graft was then trimmed to fit the size of the defect.  The graft was then placed in the primary defect and oriented appropriately.
Graft Cartilage Fenestration Text: The cartilage was fenestrated with a 2mm punch biopsy to help facilitate graft survival and healing.
Bilobed Transposition Flap Text: The defect edges were debeveled with a #15 scalpel blade.  Given the location of the defect and the proximity to free margins a bilobed transposition flap was deemed most appropriate.  Using a sterile surgical marker, an appropriate bilobe flap drawn around the defect.    The area thus outlined was incised deep to adipose tissue with a #15 scalpel blade.  The skin margins were undermined to an appropriate distance in all directions utilizing iris scissors.
Crescentic Advancement Flap Text: The defect edges were debeveled with a #15 scalpel blade.  Given the location of the defect and the proximity to free margins a crescentic advancement flap was deemed most appropriate.  Using a sterile surgical marker, the appropriate advancement flap was drawn incorporating the defect and placing the expected incisions within the relaxed skin tension lines where possible.    The area thus outlined was incised deep to adipose tissue with a #15 scalpel blade.  The skin margins were undermined to an appropriate distance in all directions utilizing iris scissors.
Bilobed Flap Text: The defect edges were debeveled with a #15 scalpel blade.  Given the location of the defect and the proximity to free margins a bilobe flap was deemed most appropriate.  Using a sterile surgical marker, an appropriate bilobe flap drawn around the defect.    The area thus outlined was incised deep to adipose tissue with a #15 scalpel blade.  The skin margins were undermined to an appropriate distance in all directions utilizing iris scissors.
Hatchet Flap Text: The defect edges were debeveled with a #15 scalpel blade.  Given the location of the defect, shape of the defect and the proximity to free margins a hatchet flap was deemed most appropriate.  Using a sterile surgical marker, an appropriate hatchet flap was drawn incorporating the defect and placing the expected incisions within the relaxed skin tension lines where possible.    The area thus outlined was incised deep to adipose tissue with a #15 scalpel blade.  The skin margins were undermined to an appropriate distance in all directions utilizing iris scissors.
Epidermal Closure: running and interrupted
Consent (Nose)/Introductory Paragraph: The rationale for Mohs was explained to the patient and consent was obtained. The risks, benefits and alternatives to therapy were discussed in detail. Specifically, the risks of nasal deformity, changes in the flow of air through the nose, infection, scarring, bleeding, prolonged wound healing, incomplete removal, allergy to anesthesia, nerve injury and recurrence were addressed. Prior to the procedure, the treatment site was clearly identified and confirmed by the patient. All components of Universal Protocol/PAUSE Rule completed.

## 2018-12-28 ENCOUNTER — APPOINTMENT (RX ONLY)
Dept: URBAN - METROPOLITAN AREA CLINIC 23 | Facility: CLINIC | Age: 73
Setting detail: DERMATOLOGY
End: 2018-12-28

## 2018-12-28 DIAGNOSIS — Z48.01 ENCOUNTER FOR CHANGE OR REMOVAL OF SURGICAL WOUND DRESSING: ICD-10-CM

## 2018-12-28 PROCEDURE — ? SUTURE REMOVAL (GLOBAL PERIOD)

## 2018-12-28 ASSESSMENT — LOCATION ZONE DERM: LOCATION ZONE: FACE

## 2018-12-28 ASSESSMENT — LOCATION DETAILED DESCRIPTION DERM: LOCATION DETAILED: LEFT SUPERIOR LATERAL BUCCAL CHEEK

## 2018-12-28 ASSESSMENT — LOCATION SIMPLE DESCRIPTION DERM: LOCATION SIMPLE: LEFT CHEEK

## 2018-12-28 NOTE — PROCEDURE: SUTURE REMOVAL (GLOBAL PERIOD)
Add 84803 Cpt? (Important Note: In 2017 The Use Of 21986 Is Being Tracked By Cms To Determine Future Global Period Reimbursement For Global Periods): no
Detail Level: Simple

## 2019-04-29 ENCOUNTER — APPOINTMENT (RX ONLY)
Dept: URBAN - METROPOLITAN AREA CLINIC 23 | Facility: CLINIC | Age: 74
Setting detail: DERMATOLOGY
End: 2019-04-29

## 2019-04-29 DIAGNOSIS — D22 MELANOCYTIC NEVI: ICD-10-CM

## 2019-04-29 DIAGNOSIS — D485 NEOPLASM OF UNCERTAIN BEHAVIOR OF SKIN: ICD-10-CM

## 2019-04-29 DIAGNOSIS — Z85.828 PERSONAL HISTORY OF OTHER MALIGNANT NEOPLASM OF SKIN: ICD-10-CM

## 2019-04-29 DIAGNOSIS — L21.8 OTHER SEBORRHEIC DERMATITIS: ICD-10-CM

## 2019-04-29 DIAGNOSIS — D18.0 HEMANGIOMA: ICD-10-CM

## 2019-04-29 PROBLEM — D48.5 NEOPLASM OF UNCERTAIN BEHAVIOR OF SKIN: Status: ACTIVE | Noted: 2019-04-29

## 2019-04-29 PROBLEM — F41.9 ANXIETY DISORDER, UNSPECIFIED: Status: ACTIVE | Noted: 2019-04-29

## 2019-04-29 PROBLEM — D18.01 HEMANGIOMA OF SKIN AND SUBCUTANEOUS TISSUE: Status: ACTIVE | Noted: 2019-04-29

## 2019-04-29 PROBLEM — I25.10 ATHEROSCLEROTIC HEART DISEASE OF NATIVE CORONARY ARTERY WITHOUT ANGINA PECTORIS: Status: ACTIVE | Noted: 2019-04-29

## 2019-04-29 PROBLEM — L29.8 OTHER PRURITUS: Status: ACTIVE | Noted: 2019-04-29

## 2019-04-29 PROBLEM — D22.4 MELANOCYTIC NEVI OF SCALP AND NECK: Status: ACTIVE | Noted: 2019-04-29

## 2019-04-29 PROCEDURE — 11102 TANGNTL BX SKIN SINGLE LES: CPT | Mod: 59

## 2019-04-29 PROCEDURE — ? OTHER

## 2019-04-29 PROCEDURE — ? COUNSELING

## 2019-04-29 PROCEDURE — ? BIOPSY BY SHAVE METHOD

## 2019-04-29 PROCEDURE — 99214 OFFICE O/P EST MOD 30 MIN: CPT | Mod: 25

## 2019-04-29 PROCEDURE — ? SHAVE REMOVAL

## 2019-04-29 PROCEDURE — ? PRESCRIPTION

## 2019-04-29 PROCEDURE — 11306 SHAVE SKIN LESION 0.6-1.0 CM: CPT

## 2019-04-29 RX ORDER — KETOCONAZOLE 20 MG/G
CREAM TOPICAL
Qty: 1 | Refills: 11 | Status: ERX | COMMUNITY
Start: 2019-04-29

## 2019-04-29 RX ORDER — FLUOCINOLONE ACETONIDE 0.11 MG/ML
OIL TOPICAL
Qty: 1 | Refills: 11 | Status: ERX

## 2019-04-29 RX ORDER — KETOCONAZOLE 20.5 MG/ML
SHAMPOO, SUSPENSION TOPICAL
Qty: 1 | Refills: 11 | Status: ERX

## 2019-04-29 RX ADMIN — KETOCONAZOLE: 20 CREAM TOPICAL at 13:19

## 2019-04-29 ASSESSMENT — LOCATION SIMPLE DESCRIPTION DERM
LOCATION SIMPLE: RIGHT CHEEK
LOCATION SIMPLE: LEFT CHEEK
LOCATION SIMPLE: LEFT FOREARM
LOCATION SIMPLE: SUPERIOR FOREHEAD
LOCATION SIMPLE: LEFT FOREHEAD
LOCATION SIMPLE: LEFT UPPER BACK
LOCATION SIMPLE: NECK
LOCATION SIMPLE: RIGHT FOREHEAD
LOCATION SIMPLE: NOSE

## 2019-04-29 ASSESSMENT — LOCATION ZONE DERM
LOCATION ZONE: FACE
LOCATION ZONE: NECK
LOCATION ZONE: ARM
LOCATION ZONE: TRUNK
LOCATION ZONE: NOSE

## 2019-04-29 ASSESSMENT — LOCATION DETAILED DESCRIPTION DERM
LOCATION DETAILED: SUPERIOR MID FOREHEAD
LOCATION DETAILED: NASAL DORSUM
LOCATION DETAILED: LEFT PROXIMAL RADIAL DORSAL FOREARM
LOCATION DETAILED: RIGHT CENTRAL MALAR CHEEK
LOCATION DETAILED: RIGHT SUPERIOR LATERAL NECK
LOCATION DETAILED: LEFT SUPERIOR LATERAL BUCCAL CHEEK
LOCATION DETAILED: LEFT MID-UPPER BACK
LOCATION DETAILED: RIGHT FOREHEAD
LOCATION DETAILED: LEFT FOREHEAD
LOCATION DETAILED: LEFT MEDIAL MALAR CHEEK

## 2019-04-29 NOTE — PROCEDURE: SHAVE REMOVAL
Bill For Surgical Tray: no
Post-Care Instructions: I reviewed with the patient in detail post-care instructions. Patient is to keep the biopsy site dry overnight, and then apply Vaseline twice daily until healed. Patient may apply dilute white vinegar (1:10; white vinegar:water) soaks to remove any crusting.
Detail Level: Detailed
Medical Necessity Information: It is in your best interest to select a reason for this procedure from the list below. All of these items fulfill various CMS LCD requirements except the new and changing color options.
Biopsy Method: Dermablade
Size Of Lesion In Cm (Required): 0.8
Billing Type: Third-Party Bill
Path Notes (To The Dermatopathologist): Please check margins.
Was A Bandage Applied: Yes
Consent was obtained from the patient. The risks and benefits to therapy were discussed in detail. Specifically, the risks of infection, scarring, bleeding, prolonged wound healing, incomplete removal, allergy to anesthesia, nerve injury and recurrence were addressed. Prior to the procedure, the treatment site was clearly identified and confirmed by the patient. All components of Universal Protocol/PAUSE Rule completed.
Medical Necessity Clause: This procedure was medically necessary because the lesion that was treated was:
Wound Care: Vaseline
X Size Of Lesion In Cm (Optional): 0
Anesthesia Type: 1% lidocaine with epinephrine
Hemostasis: Aluminum Chloride
Notification Instructions: Patient will be notified of biopsy results. However, patient instructed to call the office if not contacted within 2 weeks.

## 2019-04-29 NOTE — PROCEDURE: BIOPSY BY SHAVE METHOD
Hemostasis: Aluminum Chloride
Curettage Text: The wound bed was treated with curettage after the biopsy was performed.
Render In Bullet Format When Appropriate: No
Was A Bandage Applied: Yes
X Size Of Lesion In Cm: 0
Notification Instructions: Patient will be notified of biopsy results. However, patient instructed to call the office if not contacted within 2 weeks.
Dressing: bandage
Wound Care: Vaseline
Electrodesiccation Text: The wound bed was treated with electrodesiccation after the biopsy was performed.
Biopsy Method: Dermablade
Type Of Destruction Used: Curettage
Anesthesia Volume In Cc: 0.5
Post-Care Instructions: I reviewed with the patient in detail post-care instructions. Patient is to keep the biopsy site dry overnight, and then apply bacitracin twice daily until healed. Patient may apply hydrogen peroxide soaks to remove any crusting.
Cryotherapy Text: The wound bed was treated with cryotherapy after the biopsy was performed.
Depth Of Biopsy: dermis
Silver Nitrate Text: The wound bed was treated with silver nitrate after the biopsy was performed.
Path Notes (To The Dermatopathologist): .
Billing Type: Third-Party Bill
Electrodesiccation And Curettage Text: The wound bed was treated with electrodesiccation and curettage after the biopsy was performed.
Anesthesia Type: 1% lidocaine with epinephrine
Accession #: [id number]
Consent: Written consent was obtained and risks were reviewed including but not limited to scarring, infection, bleeding, scabbing, incomplete removal, nerve damage and allergy to anesthesia.
Biopsy Type: H and E
Detail Level: Detailed

## 2019-04-29 NOTE — PROCEDURE: OTHER
Other (Free Text): Pt has several [facility] treated in the past ( [place])
Note Text (......Xxx Chief Complaint.): This diagnosis correlates with the
Detail Level: Zone

## 2019-05-13 ENCOUNTER — APPOINTMENT (RX ONLY)
Dept: URBAN - METROPOLITAN AREA CLINIC 23 | Facility: CLINIC | Age: 74
Setting detail: DERMATOLOGY
End: 2019-05-13

## 2019-05-13 PROBLEM — C44.699 OTHER SPECIFIED MALIGNANT NEOPLASM OF SKIN OF LEFT UPPER LIMB, INCLUDING SHOULDER: Status: ACTIVE | Noted: 2019-05-13

## 2019-05-13 PROCEDURE — ? EXCISION

## 2019-05-13 PROCEDURE — 13122 CMPLX RPR S/A/L ADDL 5 CM/>: CPT | Mod: 59

## 2019-05-13 PROCEDURE — 13121 CMPLX RPR S/A/L 2.6-7.5 CM: CPT | Mod: 59

## 2019-05-13 PROCEDURE — 11604 EXC TR-EXT MAL+MARG 3.1-4 CM: CPT

## 2019-05-13 PROCEDURE — 15271 SKIN SUB GRAFT TRNK/ARM/LEG: CPT

## 2019-05-13 NOTE — PROCEDURE: EXCISION
Bi-Rhombic Flap Text: The defect edges were debeveled with a #15 scalpel blade.  Given the location of the defect and the proximity to free margins a bi-rhombic flap was deemed most appropriate.  Using a sterile surgical marker, an appropriate rhombic flap was drawn incorporating the defect. The area thus outlined was incised deep to adipose tissue with a #15 scalpel blade.  The skin margins were undermined to an appropriate distance in all directions utilizing iris scissors.
Double M-Plasty Intermediate Repair Preamble Text (Leave Blank If You Do Not Want): Undermining was performed with blunt dissection.
Anesthesia Volume In Cc: 0
Show Pathology Comment Variable: Yes
Path Notes (To The Dermatopathologist): Please check margins.
Melolabial Transposition Flap Text: The defect edges were debeveled with a #15 scalpel blade.  Given the location of the defect and the proximity to free margins a melolabial flap was deemed most appropriate.  Using a sterile surgical marker, an appropriate melolabial transposition flap was drawn incorporating the defect.    The area thus outlined was incised deep to adipose tissue with a #15 scalpel blade.  The skin margins were undermined to an appropriate distance in all directions utilizing iris scissors.
Hemostasis: Electrocautery
Pre-Excision Curettage Text (Leave Blank If You Do Not Want): Prior to drawing the surgical margin the visible lesion was removed with electrodesiccation and curettage to clearly define the lesion size.
Z Plasty Text: The lesion was extirpated to the level of the fat with a #15 scalpel blade.  Given the location of the defect, shape of the defect and the proximity to free margins a Z-plasty was deemed most appropriate for repair.  Using a sterile surgical marker, the appropriate transposition arms of the Z-plasty were drawn incorporating the defect and placing the expected incisions within the relaxed skin tension lines where possible.    The area thus outlined was incised deep to adipose tissue with a #15 scalpel blade.  The skin margins were undermined to an appropriate distance in all directions utilizing iris scissors.  The opposing transposition arms were then transposed into place in opposite direction and anchored with interrupted buried subcutaneous sutures.
Complex Repair And Single Advancement Flap Text: The defect edges were debeveled with a #15 scalpel blade.  The primary defect was closed partially with a complex linear closure.  Given the location of the remaining defect, shape of the defect and the proximity to free margins a single advancement flap was deemed most appropriate for complete closure of the defect.  Using a sterile surgical marker, an appropriate advancement flap was drawn incorporating the defect and placing the expected incisions within the relaxed skin tension lines where possible.    The area thus outlined was incised deep to adipose tissue with a #15 scalpel blade.  The skin margins were undermined to an appropriate distance in all directions utilizing iris scissors.
Complex Repair And Epidermal Autograft Text: The defect edges were debeveled with a #15 scalpel blade.  The primary defect was closed partially with a complex linear closure.  Given the location of the defect, shape of the defect and the proximity to free margins an epidermal autograft was deemed most appropriate to repair the remaining defect.  The graft was trimmed to fit the size of the remaining defect.  The graft was then placed in the primary defect, oriented appropriately, and sutured into place.
Complex Repair And V-Y Plasty Text: The defect edges were debeveled with a #15 scalpel blade.  The primary defect was closed partially with a complex linear closure.  Given the location of the remaining defect, shape of the defect and the proximity to free margins a V-Y plasty was deemed most appropriate for complete closure of the defect.  Using a sterile surgical marker, an appropriate advancement flap was drawn incorporating the defect and placing the expected incisions within the relaxed skin tension lines where possible.    The area thus outlined was incised deep to adipose tissue with a #15 scalpel blade.  The skin margins were undermined to an appropriate distance in all directions utilizing iris scissors.
Wound Care: Petrolatum
Double Island Pedicle Flap Text: The defect edges were debeveled with a #15 scalpel blade.  Given the location of the defect, shape of the defect and the proximity to free margins a double island pedicle advancement flap was deemed most appropriate.  Using a sterile surgical marker, an appropriate advancement flap was drawn incorporating the defect, outlining the appropriate donor tissue and placing the expected incisions within the relaxed skin tension lines where possible.    The area thus outlined was incised deep to adipose tissue with a #15 scalpel blade.  The skin margins were undermined to an appropriate distance in all directions around the primary defect and laterally outward around the island pedicle utilizing iris scissors.  There was minimal undermining beneath the pedicle flap.
Modified Advancement Flap Text: The defect edges were debeveled with a #15 scalpel blade.  Given the location of the defect, shape of the defect and the proximity to free margins a modified advancement flap was deemed most appropriate.  Using a sterile surgical marker, an appropriate advancement flap was drawn incorporating the defect and placing the expected incisions within the relaxed skin tension lines where possible.    The area thus outlined was incised deep to adipose tissue with a #15 scalpel blade.  The skin margins were undermined to an appropriate distance in all directions utilizing iris scissors.
Complex Repair And Tissue Cultured Epidermal Autograft Text: The defect edges were debeveled with a #15 scalpel blade.  The primary defect was closed partially with a complex linear closure.  Given the location of the defect, shape of the defect and the proximity to free margins an tissue cultured epidermal autograft was deemed most appropriate to repair the remaining defect.  The graft was trimmed to fit the size of the remaining defect.  The graft was then placed in the primary defect, oriented appropriately, and sutured into place.
Lip Wedge Excision Repair Text: Given the location of the defect and the proximity to free margins a full thickness wedge repair was deemed most appropriate.  Using a sterile surgical marker, the appropriate repair was drawn incorporating the defect and placing the expected incisions perpendicular to the vermilion border.  The vermilion border was also meticulously outlined to ensure appropriate reapproximation during the repair.  The area thus outlined was incised through and through with a #15 scalpel blade.  The muscularis and dermis were reaproximated with deep sutures following hemostasis. Care was taken to realign the vermilion border before proceeding with the superficial closure.  Once the vermilion was realigned the superfical and mucosal closure was finished.
Complex Repair And O-T Advancement Flap Text: The defect edges were debeveled with a #15 scalpel blade.  The primary defect was closed partially with a complex linear closure.  Given the location of the remaining defect, shape of the defect and the proximity to free margins an O-T advancement flap was deemed most appropriate for complete closure of the defect.  Using a sterile surgical marker, an appropriate advancement flap was drawn incorporating the defect and placing the expected incisions within the relaxed skin tension lines where possible.    The area thus outlined was incised deep to adipose tissue with a #15 scalpel blade.  The skin margins were undermined to an appropriate distance in all directions utilizing iris scissors.
Spiral Flap Text: The defect edges were debeveled with a #15 scalpel blade.  Given the location of the defect, shape of the defect and the proximity to free margins a spiral flap was deemed most appropriate.  Using a sterile surgical marker, an appropriate rotation flap was drawn incorporating the defect and placing the expected incisions within the relaxed skin tension lines where possible. The area thus outlined was incised deep to adipose tissue with a #15 scalpel blade.  The skin margins were undermined to an appropriate distance in all directions utilizing iris scissors.
Excision Method: Fusiform
Interpolation Flap Text: A decision was made to reconstruct the defect utilizing an interpolation axial flap and a staged reconstruction.  A telfa template was made of the defect.  This telfa template was then used to outline the interpolation flap.  The donor area for the pedicle flap was then injected with anesthesia.  The flap was excised through the skin and subcutaneous tissue down to the layer of the underlying musculature.  The interpolation flap was carefully excised within this deep plane to maintain its blood supply.  The edges of the donor site were undermined.   The donor site was closed in a primary fashion.  The pedicle was then rotated into position and sutured.  Once the tube was sutured into place, adequate blood supply was confirmed with blanching and refill.  The pedicle was then wrapped with xeroform gauze and dressed appropriately with a telfa and gauze bandage to ensure continued blood supply and protect the attached pedicle.
Partial Purse String (Intermediate) Text: Given the location of the defect and the characteristics of the surrounding skin an intermediate purse string closure was deemed most appropriate.  Undermining was performed circumferentially around the surgical defect.  A purse string suture was then placed and tightened. Wound tension of the circular defect prevented complete closure of the wound.
Size Of Margin In Cm: 0.6
S Plasty Text: Given the location and shape of the defect, and the orientation of relaxed skin tension lines, an S-plasty was deemed most appropriate for repair.  Using a sterile surgical marker, the appropriate outline of the S-plasty was drawn, incorporating the defect and placing the expected incisions within the relaxed skin tension lines where possible.  The area thus outlined was incised deep to adipose tissue with a #15 scalpel blade.  The skin margins were undermined to an appropriate distance in all directions utilizing iris scissors. The skin flaps were advanced over the defect.  The opposing margins were then approximated with interrupted buried subcutaneous sutures.
M-Plasty Complex Repair Preamble Text (Leave Blank If You Do Not Want): Extensive wide undermining was performed.
Repair Performed By Another Provider Text (Leave Blank If You Do Not Want): After the tissue was excised the defect was repaired by another provider.
O-Z Flap Text: The defect edges were debeveled with a #15 scalpel blade.  Given the location of the defect, shape of the defect and the proximity to free margins an O-Z flap was deemed most appropriate.  Using a sterile surgical marker, an appropriate transposition flap was drawn incorporating the defect and placing the expected incisions within the relaxed skin tension lines where possible. The area thus outlined was incised deep to adipose tissue with a #15 scalpel blade.  The skin margins were undermined to an appropriate distance in all directions utilizing iris scissors.
Validate Previous Accession (Can Hide Previous Accession In Settings Tab): No
Bilobed Transposition Flap Text: The defect edges were debeveled with a #15 scalpel blade.  Given the location of the defect and the proximity to free margins a bilobed transposition flap was deemed most appropriate.  Using a sterile surgical marker, an appropriate bilobe flap drawn around the defect.    The area thus outlined was incised deep to adipose tissue with a #15 scalpel blade.  The skin margins were undermined to an appropriate distance in all directions utilizing iris scissors.
Anesthesia Type: 1% lidocaine with 1:100,000 epinephrine and 408mcg clindamycin/ml and a 1:10 solution of 8.4% sodium bicarbonate
Complex Repair And O-L Flap Text: The defect edges were debeveled with a #15 scalpel blade.  The primary defect was closed partially with a complex linear closure.  Given the location of the remaining defect, shape of the defect and the proximity to free margins an O-L flap was deemed most appropriate for complete closure of the defect.  Using a sterile surgical marker, an appropriate flap was drawn incorporating the defect and placing the expected incisions within the relaxed skin tension lines where possible.    The area thus outlined was incised deep to adipose tissue with a #15 scalpel blade.  The skin margins were undermined to an appropriate distance in all directions utilizing iris scissors.
Complex Repair And Skin Substitute Graft Text: The defect edges were debeveled with a #15 scalpel blade.  The primary defect was closed partially with a complex linear closure.  Given the location of the remaining defect, shape of the defect and the proximity to free margins a skin substitute graft was deemed most appropriate to repair the remaining defect.  The graft was trimmed to fit the size of the remaining defect.  The graft was then placed in the primary defect, oriented appropriately, and sutured into place.
Saucerization Excision Additional Text (Leave Blank If You Do Not Want): The margin was drawn around the clinically apparent lesion.  Incisions were then made along these lines, in a tangential fashion, to the appropriate tissue plane and the lesion was extirpated.
H Plasty Text: Given the location of the defect, shape of the defect and the proximity to free margins a H-plasty was deemed most appropriate for repair.  Using a sterile surgical marker, the appropriate advancement arms of the H-plasty were drawn incorporating the defect and placing the expected incisions within the relaxed skin tension lines where possible. The area thus outlined was incised deep to adipose tissue with a #15 scalpel blade. The skin margins were undermined to an appropriate distance in all directions utilizing iris scissors.  The opposing advancement arms were then advanced into place in opposite direction and anchored with interrupted buried subcutaneous sutures.
Rhomboid Transposition Flap Text: The defect edges were debeveled with a #15 scalpel blade.  Given the location of the defect and the proximity to free margins a rhomboid transposition flap was deemed most appropriate.  Using a sterile surgical marker, an appropriate rhomboid flap was drawn incorporating the defect.    The area thus outlined was incised deep to adipose tissue with a #15 scalpel blade.  The skin margins were undermined to an appropriate distance in all directions utilizing iris scissors.
Positioning (Leave Blank If You Do Not Want): The patient was placed in a comfortable position exposing the surgical site.
Star Wedge Flap Text: The defect edges were debeveled with a #15 scalpel blade.  Given the location of the defect, shape of the defect and the proximity to free margins a star wedge flap was deemed most appropriate.  Using a sterile surgical marker, an appropriate rotation flap was drawn incorporating the defect and placing the expected incisions within the relaxed skin tension lines where possible. The area thus outlined was incised deep to adipose tissue with a #15 scalpel blade.  The skin margins were undermined to an appropriate distance in all directions utilizing iris scissors.
Excisional Biopsy Additional Text (Leave Blank If You Do Not Want): The margin was drawn around the clinically apparent lesion. An elliptical shape was then drawn on the skin incorporating the lesion and margins.  Incisions were then made along these lines to the appropriate tissue plane and the lesion was extirpated.
Crescentic Advancement Flap Text: The defect edges were debeveled with a #15 scalpel blade.  Given the location of the defect and the proximity to free margins a crescentic advancement flap was deemed most appropriate.  Using a sterile surgical marker, the appropriate advancement flap was drawn incorporating the defect and placing the expected incisions within the relaxed skin tension lines where possible.    The area thus outlined was incised deep to adipose tissue with a #15 scalpel blade.  The skin margins were undermined to an appropriate distance in all directions utilizing iris scissors.
Banner Transposition Flap Text: The defect edges were debeveled with a #15 scalpel blade.  Given the location of the defect and the proximity to free margins a Banner transposition flap was deemed most appropriate.  Using a sterile surgical marker, an appropriate flap drawn around the defect. The area thus outlined was incised deep to adipose tissue with a #15 scalpel blade.  The skin margins were undermined to an appropriate distance in all directions utilizing iris scissors.
Billing Type: Third-Party Bill
Epidermal Autograft Text: The defect edges were debeveled with a #15 scalpel blade.  Given the location of the defect, shape of the defect and the proximity to free margins an epidermal autograft was deemed most appropriate.  Using a sterile surgical marker, the primary defect shape was transferred to the donor site. The epidermal graft was then harvested.  The skin graft was then placed in the primary defect and oriented appropriately.
Advancement-Rotation Flap Text: The defect edges were debeveled with a #15 scalpel blade.  Given the location of the defect, shape of the defect and the proximity to free margins an advancement-rotation flap was deemed most appropriate.  Using a sterile surgical marker, an appropriate flap was drawn incorporating the defect and placing the expected incisions within the relaxed skin tension lines where possible. The area thus outlined was incised deep to adipose tissue with a #15 scalpel blade.  The skin margins were undermined to an appropriate distance in all directions utilizing iris scissors.
A-T Advancement Flap Text: The defect edges were debeveled with a #15 scalpel blade.  Given the location of the defect, shape of the defect and the proximity to free margins an A-T advancement flap was deemed most appropriate.  Using a sterile surgical marker, an appropriate advancement flap was drawn incorporating the defect and placing the expected incisions within the relaxed skin tension lines where possible.    The area thus outlined was incised deep to adipose tissue with a #15 scalpel blade.  The skin margins were undermined to an appropriate distance in all directions utilizing iris scissors.
Complex Repair And Modified Advancement Flap Text: The defect edges were debeveled with a #15 scalpel blade.  The primary defect was closed partially with a complex linear closure.  Given the location of the remaining defect, shape of the defect and the proximity to free margins a modified advancement flap was deemed most appropriate for complete closure of the defect.  Using a sterile surgical marker, an appropriate advancement flap was drawn incorporating the defect and placing the expected incisions within the relaxed skin tension lines where possible.    The area thus outlined was incised deep to adipose tissue with a #15 scalpel blade.  The skin margins were undermined to an appropriate distance in all directions utilizing iris scissors.
Melolabial Interpolation Flap Text: A decision was made to reconstruct the defect utilizing an interpolation axial flap and a staged reconstruction.  A telfa template was made of the defect.  This telfa template was then used to outline the melolabial interpolation flap.  The donor area for the pedicle flap was then injected with anesthesia.  The flap was excised through the skin and subcutaneous tissue down to the layer of the underlying musculature.  The pedicle flap was carefully excised within this deep plane to maintain its blood supply.  The edges of the donor site were undermined.   The donor site was closed in a primary fashion.  The pedicle was then rotated into position and sutured.  Once the tube was sutured into place, adequate blood supply was confirmed with blanching and refill.  The pedicle was then wrapped with xeroform gauze and dressed appropriately with a telfa and gauze bandage to ensure continued blood supply and protect the attached pedicle.
Anesthesia Volume In Cc: 9
Complex Repair And M Plasty Text: The defect edges were debeveled with a #15 scalpel blade.  The primary defect was closed partially with a complex linear closure.  Given the location of the remaining defect, shape of the defect and the proximity to free margins an M plasty was deemed most appropriate for complete closure of the defect.  Using a sterile surgical marker, an appropriate advancement flap was drawn incorporating the defect and placing the expected incisions within the relaxed skin tension lines where possible.    The area thus outlined was incised deep to adipose tissue with a #15 scalpel blade.  The skin margins were undermined to an appropriate distance in all directions utilizing iris scissors.
Island Pedicle Flap-Requiring Vessel Identification Text: The defect edges were debeveled with a #15 scalpel blade.  Given the location of the defect, shape of the defect and the proximity to free margins an island pedicle advancement flap was deemed most appropriate.  Using a sterile surgical marker, an appropriate advancement flap was drawn, based on the axial vessel mentioned above, incorporating the defect, outlining the appropriate donor tissue and placing the expected incisions within the relaxed skin tension lines where possible.    The area thus outlined was incised deep to adipose tissue with a #15 scalpel blade.  The skin margins were undermined to an appropriate distance in all directions around the primary defect and laterally outward around the island pedicle utilizing iris scissors.  There was minimal undermining beneath the pedicle flap.
Consent was obtained from the patient. The risks and benefits to therapy were discussed in detail. Specifically, the risks of infection, scarring, bleeding, prolonged wound healing, incomplete removal, allergy to anesthesia, nerve injury and recurrence were addressed. Prior to the procedure, the treatment site was clearly identified and confirmed by the patient. All components of Universal Protocol/PAUSE Rule completed.
Repair Type: Complex Repair and Graft
Elliptical Excision Additional Text (Leave Blank If You Do Not Want): The margin was drawn around the clinically apparent lesion.  An elliptical shape was then drawn on the skin incorporating the lesion and margins.  Incisions were then made along these lines to the appropriate tissue plane and the lesion was extirpated.
Island Pedicle Flap With Canthal Suspension Text: The defect edges were debeveled with a #15 scalpel blade.  Given the location of the defect, shape of the defect and the proximity to free margins an island pedicle advancement flap was deemed most appropriate.  Using a sterile surgical marker, an appropriate advancement flap was drawn incorporating the defect, outlining the appropriate donor tissue and placing the expected incisions within the relaxed skin tension lines where possible. The area thus outlined was incised deep to adipose tissue with a #15 scalpel blade.  The skin margins were undermined to an appropriate distance in all directions around the primary defect and laterally outward around the island pedicle utilizing iris scissors.  There was minimal undermining beneath the pedicle flap. A suspension suture was placed in the canthal tendon to prevent tension and prevent ectropion.
Transposition Flap Text: The defect edges were debeveled with a #15 scalpel blade.  Given the location of the defect and the proximity to free margins a transposition flap was deemed most appropriate.  Using a sterile surgical marker, an appropriate transposition flap was drawn incorporating the defect.    The area thus outlined was incised deep to adipose tissue with a #15 scalpel blade.  The skin margins were undermined to an appropriate distance in all directions utilizing iris scissors.
Mucosal Advancement Flap Text: Given the location of the defect, shape of the defect and the proximity to free margins a mucosal advancement flap was deemed most appropriate. Incisions were made with a 15 blade scalpel in the appropriate fashion along the cutaneous vermilion border and the mucosal lip. The remaining actinically damaged mucosal tissue was excised.  The mucosal advancement flap was then elevated to the gingival sulcus with care taken to preserve the neurovascular structures and advanced into the primary defect. Care was taken to ensure that precise realignment of the vermilion border was achieved.
Bilateral Helical Rim Advancement Flap Text: The defect edges were debeveled with a #15 blade scalpel.  Given the location of the defect and the proximity to free margins (helical rim) a bilateral helical rim advancement flap was deemed most appropriate.  Using a sterile surgical marker, the appropriate advancement flaps were drawn incorporating the defect and placing the expected incisions between the helical rim and antihelix where possible.  The area thus outlined was incised through and through with a #15 scalpel blade.  With a skin hook and iris scissors, the flaps were gently and sharply undermined and freed up.
Perilesional Excision Additional Text (Leave Blank If You Do Not Want): The margin was drawn around the clinically apparent lesion. Incisions were then made along these lines to the appropriate tissue plane and the lesion was extirpated.
Cartilage Graft Text: The defect edges were debeveled with a #15 scalpel blade.  Given the location of the defect, shape of the defect, the fact the defect involved a full thickness cartilage defect a cartilage graft was deemed most appropriate.  An appropriate donor site was identified, cleansed, and anesthetized. The cartilage graft was then harvested and transferred to the recipient site, oriented appropriately and then sutured into place.  The secondary defect was then repaired using a primary closure.
Island Pedicle Flap Text: The defect edges were debeveled with a #15 scalpel blade.  Given the location of the defect, shape of the defect and the proximity to free margins an island pedicle advancement flap was deemed most appropriate.  Using a sterile surgical marker, an appropriate advancement flap was drawn incorporating the defect, outlining the appropriate donor tissue and placing the expected incisions within the relaxed skin tension lines where possible.    The area thus outlined was incised deep to adipose tissue with a #15 scalpel blade.  The skin margins were undermined to an appropriate distance in all directions around the primary defect and laterally outward around the island pedicle utilizing iris scissors.  There was minimal undermining beneath the pedicle flap.
O-Z Plasty Text: The defect edges were debeveled with a #15 scalpel blade.  Given the location of the defect, shape of the defect and the proximity to free margins an O-Z plasty (double transposition flap) was deemed most appropriate.  Using a sterile surgical marker, the appropriate transposition flaps were drawn incorporating the defect and placing the expected incisions within the relaxed skin tension lines where possible.    The area thus outlined was incised deep to adipose tissue with a #15 scalpel blade.  The skin margins were undermined to an appropriate distance in all directions utilizing iris scissors.  Hemostasis was achieved with electrocautery.  The flaps were then transposed into place, one clockwise and the other counterclockwise, and anchored with interrupted buried subcutaneous sutures.
Skin Substitute Text: The defect edges were debeveled with a #15 scalpel blade.  Given the location of the defect, shape of the defect and the proximity to free margins a skin substitute graft was deemed most appropriate.  The graft material was trimmed to fit the size of the defect. The graft was then placed in the primary defect and oriented appropriately.
O-L Flap Text: The defect edges were debeveled with a #15 scalpel blade.  Given the location of the defect, shape of the defect and the proximity to free margins an O-L flap was deemed most appropriate.  Using a sterile surgical marker, an appropriate advancement flap was drawn incorporating the defect and placing the expected incisions within the relaxed skin tension lines where possible.    The area thus outlined was incised deep to adipose tissue with a #15 scalpel blade.  The skin margins were undermined to an appropriate distance in all directions utilizing iris scissors.
Complex Repair And W Plasty Text: The defect edges were debeveled with a #15 scalpel blade.  The primary defect was closed partially with a complex linear closure.  Given the location of the remaining defect, shape of the defect and the proximity to free margins a W plasty was deemed most appropriate for complete closure of the defect.  Using a sterile surgical marker, an appropriate advancement flap was drawn incorporating the defect and placing the expected incisions within the relaxed skin tension lines where possible.    The area thus outlined was incised deep to adipose tissue with a #15 scalpel blade.  The skin margins were undermined to an appropriate distance in all directions utilizing iris scissors.
Rotation Flap Text: The defect edges were debeveled with a #15 scalpel blade.  Given the location of the defect, shape of the defect and the proximity to free margins a rotation flap was deemed most appropriate.  Using a sterile surgical marker, an appropriate rotation flap was drawn incorporating the defect and placing the expected incisions within the relaxed skin tension lines where possible.    The area thus outlined was incised deep to adipose tissue with a #15 scalpel blade.  The skin margins were undermined to an appropriate distance in all directions utilizing iris scissors.
V-Y Plasty Text: The defect edges were debeveled with a #15 scalpel blade.  Given the location of the defect, shape of the defect and the proximity to free margins an V-Y advancement flap was deemed most appropriate.  Using a sterile surgical marker, an appropriate advancement flap was drawn incorporating the defect and placing the expected incisions within the relaxed skin tension lines where possible.    The area thus outlined was incised deep to adipose tissue with a #15 scalpel blade.  The skin margins were undermined to an appropriate distance in all directions utilizing iris scissors.
Complex Repair And Z Plasty Text: The defect edges were debeveled with a #15 scalpel blade.  The primary defect was closed partially with a complex linear closure.  Given the location of the remaining defect, shape of the defect and the proximity to free margins a Z plasty was deemed most appropriate for complete closure of the defect.  Using a sterile surgical marker, an appropriate advancement flap was drawn incorporating the defect and placing the expected incisions within the relaxed skin tension lines where possible.    The area thus outlined was incised deep to adipose tissue with a #15 scalpel blade.  The skin margins were undermined to an appropriate distance in all directions utilizing iris scissors.
O-T Advancement Flap Text: The defect edges were debeveled with a #15 scalpel blade.  Given the location of the defect, shape of the defect and the proximity to free margins an O-T advancement flap was deemed most appropriate.  Using a sterile surgical marker, an appropriate advancement flap was drawn incorporating the defect and placing the expected incisions within the relaxed skin tension lines where possible.    The area thus outlined was incised deep to adipose tissue with a #15 scalpel blade.  The skin margins were undermined to an appropriate distance in all directions utilizing iris scissors.
Suture Removal: 14 days
Posterior Auricular Interpolation Flap Text: A decision was made to reconstruct the defect utilizing an interpolation axial flap and a staged reconstruction.  A telfa template was made of the defect.  This telfa template was then used to outline the posterior auricular interpolation flap.  The donor area for the pedicle flap was then injected with anesthesia.  The flap was excised through the skin and subcutaneous tissue down to the layer of the underlying musculature.  The pedicle flap was carefully excised within this deep plane to maintain its blood supply.  The edges of the donor site were undermined.   The donor site was closed in a primary fashion.  The pedicle was then rotated into position and sutured.  Once the tube was sutured into place, adequate blood supply was confirmed with blanching and refill.  The pedicle was then wrapped with xeroform gauze and dressed appropriately with a telfa and gauze bandage to ensure continued blood supply and protect the attached pedicle.
Keystone Flap Text: The defect edges were debeveled with a #15 scalpel blade.  Given the location of the defect, shape of the defect a keystone flap was deemed most appropriate.  Using a sterile surgical marker, an appropriate keystone flap was drawn incorporating the defect, outlining the appropriate donor tissue and placing the expected incisions within the relaxed skin tension lines where possible. The area thus outlined was incised deep to adipose tissue with a #15 scalpel blade.  The skin margins were undermined to an appropriate distance in all directions around the primary defect and laterally outward around the flap utilizing iris scissors.
Complex Repair And Split-Thickness Skin Graft Text: The defect edges were debeveled with a #15 scalpel blade.  The primary defect was closed partially with a complex linear closure.  Given the location of the defect, shape of the defect and the proximity to free margins a split thickness skin graft was deemed most appropriate to repair the remaining defect.  The graft was trimmed to fit the size of the remaining defect.  The graft was then placed in the primary defect, oriented appropriately, and sutured into place.
Detail Level: Detailed
Cheek Interpolation Flap Text: A decision was made to reconstruct the defect utilizing an interpolation axial flap and a staged reconstruction.  A telfa template was made of the defect.  This telfa template was then used to outline the Cheek Interpolation flap.  The donor area for the pedicle flap was then injected with anesthesia.  The flap was excised through the skin and subcutaneous tissue down to the layer of the underlying musculature.  The interpolation flap was carefully excised within this deep plane to maintain its blood supply.  The edges of the donor site were undermined.   The donor site was closed in a primary fashion.  The pedicle was then rotated into position and sutured.  Once the tube was sutured into place, adequate blood supply was confirmed with blanching and refill.  The pedicle was then wrapped with xeroform gauze and dressed appropriately with a telfa and gauze bandage to ensure continued blood supply and protect the attached pedicle.
Home Suture Removal Text: Patient was provided a home suture removal kit and will remove their sutures at home.  If they have any questions or difficulties they will call the office.
Rhombic Flap Text: The defect edges were debeveled with a #15 scalpel blade.  Given the location of the defect and the proximity to free margins a rhombic flap was deemed most appropriate.  Using a sterile surgical marker, an appropriate rhombic flap was drawn incorporating the defect.    The area thus outlined was incised deep to adipose tissue with a #15 scalpel blade.  The skin margins were undermined to an appropriate distance in all directions utilizing iris scissors.
Excision Depth: adipose tissue
Size Of Lesion In Cm: 2
Purse String (Intermediate) Text: Given the location of the defect and the characteristics of the surrounding skin a purse string intermediate closure was deemed most appropriate.  Undermining was performed circumfirentially around the surgical defect.  A purse string suture was then placed and tightened.
Complex Repair And Rhombic Flap Text: The defect edges were debeveled with a #15 scalpel blade.  The primary defect was closed partially with a complex linear closure.  Given the location of the remaining defect, shape of the defect and the proximity to free margins a rhombic flap was deemed most appropriate for complete closure of the defect.  Using a sterile surgical marker, an appropriate advancement flap was drawn incorporating the defect and placing the expected incisions within the relaxed skin tension lines where possible.    The area thus outlined was incised deep to adipose tissue with a #15 scalpel blade.  The skin margins were undermined to an appropriate distance in all directions utilizing iris scissors.
Dorsal Nasal Flap Text: The defect edges were debeveled with a #15 scalpel blade.  Given the location of the defect and the proximity to free margins a dorsal nasal flap was deemed most appropriate.  Using a sterile surgical marker, an appropriate dorsal nasal flap was drawn around the defect.    The area thus outlined was incised deep to adipose tissue with a #15 scalpel blade.  The skin margins were undermined to an appropriate distance in all directions utilizing iris scissors.
Complex Repair And Double M Plasty Text: The defect edges were debeveled with a #15 scalpel blade.  The primary defect was closed partially with a complex linear closure.  Given the location of the remaining defect, shape of the defect and the proximity to free margins a double M plasty was deemed most appropriate for complete closure of the defect.  Using a sterile surgical marker, an appropriate advancement flap was drawn incorporating the defect and placing the expected incisions within the relaxed skin tension lines where possible.    The area thus outlined was incised deep to adipose tissue with a #15 scalpel blade.  The skin margins were undermined to an appropriate distance in all directions utilizing iris scissors.
Partial Purse String (Simple) Text: Given the location of the defect and the characteristics of the surrounding skin a simple purse string closure was deemed most appropriate.  Undermining was performed circumferentially around the surgical defect.  A purse string suture was then placed and tightened. Wound tension of the circular defect prevented complete closure of the wound.
Ear Star Wedge Flap Text: The defect edges were debeveled with a #15 blade scalpel.  Given the location of the defect and the proximity to free margins (helical rim) an ear star wedge flap was deemed most appropriate.  Using a sterile surgical marker, the appropriate flap was drawn incorporating the defect and placing the expected incisions between the helical rim and antihelix where possible.  The area thus outlined was incised through and through with a #15 scalpel blade.
Additional Primary Defect Width (In Cm): 1
Graft Basting Suture (Optional): 5-0 Fast Absorbing Gut
No Repair - Repaired With Adjacent Surgical Defect Text (Leave Blank If You Do Not Want): After the excision the defect was repaired concurrently with another surgical defect which was in close approximation.
Deep Sutures: 3-0 PDS
Complex Repair And Transposition Flap Text: The defect edges were debeveled with a #15 scalpel blade.  The primary defect was closed partially with a complex linear closure.  Given the location of the remaining defect, shape of the defect and the proximity to free margins a transposition flap was deemed most appropriate for complete closure of the defect.  Using a sterile surgical marker, an appropriate advancement flap was drawn incorporating the defect and placing the expected incisions within the relaxed skin tension lines where possible.    The area thus outlined was incised deep to adipose tissue with a #15 scalpel blade.  The skin margins were undermined to an appropriate distance in all directions utilizing iris scissors.
Mastoid Interpolation Flap Text: A decision was made to reconstruct the defect utilizing an interpolation axial flap and a staged reconstruction.  A telfa template was made of the defect.  This telfa template was then used to outline the mastoid interpolation flap.  The donor area for the pedicle flap was then injected with anesthesia.  The flap was excised through the skin and subcutaneous tissue down to the layer of the underlying musculature.  The pedicle flap was carefully excised within this deep plane to maintain its blood supply.  The edges of the donor site were undermined.   The donor site was closed in a primary fashion.  The pedicle was then rotated into position and sutured.  Once the tube was sutured into place, adequate blood supply was confirmed with blanching and refill.  The pedicle was then wrapped with xeroform gauze and dressed appropriately with a telfa and gauze bandage to ensure continued blood supply and protect the attached pedicle.
Split-Thickness Skin Graft Text: The defect edges were debeveled with a #15 scalpel blade.  Given the location of the defect, shape of the defect and the proximity to free margins a split thickness skin graft was deemed most appropriate.  Using a sterile surgical marker, the primary defect shape was transferred to the donor site. The split thickness graft was then harvested.  The skin graft was then placed in the primary defect and oriented appropriately.
Additional Anesthesia Volume In Cc: 6
Curvilinear Excision Additional Text (Leave Blank If You Do Not Want): The margin was drawn around the clinically apparent lesion.  A curvilinear shape was then drawn on the skin incorporating the lesion and margins.  Incisions were then made along these lines to the appropriate tissue plane and the lesion was extirpated.
Scalpel Size: 15 blade
Fusiform Excision Additional Text (Leave Blank If You Do Not Want): The margin was drawn around the clinically apparent lesion.  A fusiform shape was then drawn on the skin incorporating the lesion and margins.  Incisions were then made along these lines to the appropriate tissue plane and the lesion was extirpated.
Complex Repair And Dermal Autograft Text: The defect edges were debeveled with a #15 scalpel blade.  The primary defect was closed partially with a complex linear closure.  Given the location of the defect, shape of the defect and the proximity to free margins an dermal autograft was deemed most appropriate to repair the remaining defect.  The graft was trimmed to fit the size of the remaining defect.  The graft was then placed in the primary defect, oriented appropriately, and sutured into place.
Burow's Advancement Flap Text: The defect edges were debeveled with a #15 scalpel blade.  Given the location of the defect and the proximity to free margins a Burow's advancement flap was deemed most appropriate.  Using a sterile surgical marker, the appropriate advancement flap was drawn incorporating the defect and placing the expected incisions within the relaxed skin tension lines where possible.    The area thus outlined was incised deep to adipose tissue with a #15 scalpel blade.  The skin margins were undermined to an appropriate distance in all directions utilizing iris scissors.
Muscle Hinge Flap Text: The defect edges were debeveled with a #15 scalpel blade.  Given the size, depth and location of the defect and the proximity to free margins a muscle hinge flap was deemed most appropriate.  Using a sterile surgical marker, an appropriate hinge flap was drawn incorporating the defect. The area thus outlined was incised with a #15 scalpel blade.  The skin margins were undermined to an appropriate distance in all directions utilizing iris scissors.
Advancement Flap (Single) Text: The defect edges were debeveled with a #15 scalpel blade.  Given the location of the defect and the proximity to free margins a single advancement flap was deemed most appropriate.  Using a sterile surgical marker, an appropriate advancement flap was drawn incorporating the defect and placing the expected incisions within the relaxed skin tension lines where possible.    The area thus outlined was incised deep to adipose tissue with a #15 scalpel blade.  The skin margins were undermined to an appropriate distance in all directions utilizing iris scissors.
Composite Graft Text: The defect edges were debeveled with a #15 scalpel blade.  Given the location of the defect, shape of the defect, the proximity to free margins and the fact the defect was full thickness a composite graft was deemed most appropriate.  The defect was outline and then transferred to the donor site.  A full thickness graft was then excised from the donor site. The graft was then placed in the primary defect, oriented appropriately and then sutured into place.  The secondary defect was then repaired using a primary closure.
Dermal Autograft Text: The defect edges were debeveled with a #15 scalpel blade.  Given the location of the defect, shape of the defect and the proximity to free margins a dermal autograft was deemed most appropriate.  Using a sterile surgical marker, the primary defect shape was transferred to the donor site. The area thus outlined was incised deep to adipose tissue with a #15 scalpel blade.  The harvested graft was then trimmed of adipose and epidermal tissue until only dermis was left.  The skin graft was then placed in the primary defect and oriented appropriately.
Helical Rim Advancement Flap Text: The defect edges were debeveled with a #15 blade scalpel.  Given the location of the defect and the proximity to free margins (helical rim) a double helical rim advancement flap was deemed most appropriate.  Using a sterile surgical marker, the appropriate advancement flaps were drawn incorporating the defect and placing the expected incisions between the helical rim and antihelix where possible.  The area thus outlined was incised through and through with a #15 scalpel blade.  With a skin hook and iris scissors, the flaps were gently and sharply undermined and freed up.
Cheek-To-Nose Interpolation Flap Text: A decision was made to reconstruct the defect utilizing an interpolation axial flap and a staged reconstruction.  A telfa template was made of the defect.  This telfa template was then used to outline the Cheek-To-Nose Interpolation flap.  The donor area for the pedicle flap was then injected with anesthesia.  The flap was excised through the skin and subcutaneous tissue down to the layer of the underlying musculature.  The interpolation flap was carefully excised within this deep plane to maintain its blood supply.  The edges of the donor site were undermined.   The donor site was closed in a primary fashion.  The pedicle was then rotated into position and sutured.  Once the tube was sutured into place, adequate blood supply was confirmed with blanching and refill.  The pedicle was then wrapped with xeroform gauze and dressed appropriately with a telfa and gauze bandage to ensure continued blood supply and protect the attached pedicle.
Slit Excision Additional Text (Leave Blank If You Do Not Want): A linear line was drawn on the skin overlying the lesion. An incision was made slowly until the lesion was visualized.  Once visualized, the lesion was removed with blunt dissection.
Epidermal Sutures: 5-0 Prolene
Estimated Blood Loss (Cc): minimal
Bilobed Flap Text: The defect edges were debeveled with a #15 scalpel blade.  Given the location of the defect and the proximity to free margins a bilobe flap was deemed most appropriate.  Using a sterile surgical marker, an appropriate bilobe flap drawn around the defect.    The area thus outlined was incised deep to adipose tissue with a #15 scalpel blade.  The skin margins were undermined to an appropriate distance in all directions utilizing iris scissors.
Graft Type: Xenograft
W Plasty Text: The lesion was extirpated to the level of the fat with a #15 scalpel blade.  Given the location of the defect, shape of the defect and the proximity to free margins a W-plasty was deemed most appropriate for repair.  Using a sterile surgical marker, the appropriate transposition arms of the W-plasty were drawn incorporating the defect and placing the expected incisions within the relaxed skin tension lines where possible.    The area thus outlined was incised deep to adipose tissue with a #15 scalpel blade.  The skin margins were undermined to an appropriate distance in all directions utilizing iris scissors.  The opposing transposition arms were then transposed into place in opposite direction and anchored with interrupted buried subcutaneous sutures.
Complex Repair And Bilobe Flap Text: The defect edges were debeveled with a #15 scalpel blade.  The primary defect was closed partially with a complex linear closure.  Given the location of the remaining defect, shape of the defect and the proximity to free margins a bilobe flap was deemed most appropriate for complete closure of the defect.  Using a sterile surgical marker, an appropriate advancement flap was drawn incorporating the defect and placing the expected incisions within the relaxed skin tension lines where possible.    The area thus outlined was incised deep to adipose tissue with a #15 scalpel blade.  The skin margins were undermined to an appropriate distance in all directions utilizing iris scissors.
Tissue Cultured Epidermal Autograft Text: The defect edges were debeveled with a #15 scalpel blade.  Given the location of the defect, shape of the defect and the proximity to free margins a tissue cultured epidermal autograft was deemed most appropriate.  The graft was then trimmed to fit the size of the defect.  The graft was then placed in the primary defect and oriented appropriately.
Anesthesia Type: 1% lidocaine with epinephrine
Information: Selecting Yes will display possible errors in your note based on the variables you have selected. This validation is only offered as a suggestion for you. PLEASE NOTE THAT THE VALIDATION TEXT WILL BE REMOVED WHEN YOU FINALIZE YOUR NOTE. IF YOU WANT TO FAX A PRELIMINARY NOTE YOU WILL NEED TO TOGGLE THIS TO 'NO' IF YOU DO NOT WANT IT IN YOUR FAXED NOTE.
Post-Care Instructions: I reviewed with the patient in detail post-care instructions. Patient is not to engage in any heavy lifting, exercise, or swimming for the next 14 days. Should the patient develop any fevers, chills, bleeding, severe pain patient will contact the office immediately.
Accession #: [id number]
Complex Repair And Double Advancement Flap Text: The defect edges were debeveled with a #15 scalpel blade.  The primary defect was closed partially with a complex linear closure.  Given the location of the remaining defect, shape of the defect and the proximity to free margins a double advancement flap was deemed most appropriate for complete closure of the defect.  Using a sterile surgical marker, an appropriate advancement flap was drawn incorporating the defect and placing the expected incisions within the relaxed skin tension lines where possible.    The area thus outlined was incised deep to adipose tissue with a #15 scalpel blade.  The skin margins were undermined to an appropriate distance in all directions utilizing iris scissors.
Xenograft Text: The defect edges were debeveled with a #15 scalpel blade.  Given the location of the defect, shape of the defect and the proximity to free margins a xenograft was deemed most appropriate.  The graft was then trimmed to fit the size of the defect.  The graft was then placed in the primary defect and oriented appropriately.
Intermediate / Complex Repair - Final Wound Length In Cm: 8.5
Alar Island Pedicle Flap Text: The defect edges were debeveled with a #15 scalpel blade.  Given the location of the defect, shape of the defect and the proximity to the alar rim an island pedicle advancement flap was deemed most appropriate.  Using a sterile surgical marker, an appropriate advancement flap was drawn incorporating the defect, outlining the appropriate donor tissue and placing the expected incisions within the nasal ala running parallel to the alar rim. The area thus outlined was incised with a #15 scalpel blade.  The skin margins were undermined minimally to an appropriate distance in all directions around the primary defect and laterally outward around the island pedicle utilizing iris scissors.  There was minimal undermining beneath the pedicle flap.
Paramedian Forehead Flap Text: A decision was made to reconstruct the defect utilizing an interpolation axial flap and a staged reconstruction.  A telfa template was made of the defect.  This telfa template was then used to outline the paramedian forehead pedicle flap.  The donor area for the pedicle flap was then injected with anesthesia.  The flap was excised through the skin and subcutaneous tissue down to the layer of the underlying musculature.  The pedicle flap was carefully excised within this deep plane to maintain its blood supply.  The edges of the donor site were undermined.   The donor site was closed in a primary fashion.  The pedicle was then rotated into position and sutured.  Once the tube was sutured into place, adequate blood supply was confirmed with blanching and refill.  The pedicle was then wrapped with xeroform gauze and dressed appropriately with a telfa and gauze bandage to ensure continued blood supply and protect the attached pedicle.
Mercedes Flap Text: The defect edges were debeveled with a #15 scalpel blade.  Given the location of the defect, shape of the defect and the proximity to free margins a Mercedes flap was deemed most appropriate.  Using a sterile surgical marker, an appropriate advancement flap was drawn incorporating the defect and placing the expected incisions within the relaxed skin tension lines where possible. The area thus outlined was incised deep to adipose tissue with a #15 scalpel blade.  The skin margins were undermined to an appropriate distance in all directions utilizing iris scissors.
Advancement Flap (Double) Text: The defect edges were debeveled with a #15 scalpel blade.  Given the location of the defect and the proximity to free margins a double advancement flap was deemed most appropriate.  Using a sterile surgical marker, the appropriate advancement flaps were drawn incorporating the defect and placing the expected incisions within the relaxed skin tension lines where possible.    The area thus outlined was incised deep to adipose tissue with a #15 scalpel blade.  The skin margins were undermined to an appropriate distance in all directions utilizing iris scissors.
Complex Repair And Xenograft Text: The defect edges were debeveled with a #15 scalpel blade.  The primary defect was closed partially with a complex linear closure.  Given the location of the defect, shape of the defect and the proximity to free margins a xenograft was deemed most appropriate to repair the remaining defect.  The graft was trimmed to fit the size of the remaining defect.  The graft was then placed in the primary defect, oriented appropriately, and sutured into place.
Complex Repair And A-T Advancement Flap Text: The defect edges were debeveled with a #15 scalpel blade.  The primary defect was closed partially with a complex linear closure.  Given the location of the remaining defect, shape of the defect and the proximity to free margins an A-T advancement flap was deemed most appropriate for complete closure of the defect.  Using a sterile surgical marker, an appropriate advancement flap was drawn incorporating the defect and placing the expected incisions within the relaxed skin tension lines where possible.    The area thus outlined was incised deep to adipose tissue with a #15 scalpel blade.  The skin margins were undermined to an appropriate distance in all directions utilizing iris scissors.
Hatchet Flap Text: The defect edges were debeveled with a #15 scalpel blade.  Given the location of the defect, shape of the defect and the proximity to free margins a hatchet flap was deemed most appropriate.  Using a sterile surgical marker, an appropriate hatchet flap was drawn incorporating the defect and placing the expected incisions within the relaxed skin tension lines where possible.    The area thus outlined was incised deep to adipose tissue with a #15 scalpel blade.  The skin margins were undermined to an appropriate distance in all directions utilizing iris scissors.
Complex Repair And Melolabial Flap Text: The defect edges were debeveled with a #15 scalpel blade.  The primary defect was closed partially with a complex linear closure.  Given the location of the remaining defect, shape of the defect and the proximity to free margins a melolabial flap was deemed most appropriate for complete closure of the defect.  Using a sterile surgical marker, an appropriate advancement flap was drawn incorporating the defect and placing the expected incisions within the relaxed skin tension lines where possible.    The area thus outlined was incised deep to adipose tissue with a #15 scalpel blade.  The skin margins were undermined to an appropriate distance in all directions utilizing iris scissors.
Purse String (Simple) Text: Given the location of the defect and the characteristics of the surrounding skin a purse string simple closure was deemed most appropriate.  Undermining was performed circumferentially around the surgical defect.  A purse string suture was then placed and tightened.
Complex Repair And Rotation Flap Text: The defect edges were debeveled with a #15 scalpel blade.  The primary defect was closed partially with a complex linear closure.  Given the location of the remaining defect, shape of the defect and the proximity to free margins a rotation flap was deemed most appropriate for complete closure of the defect.  Using a sterile surgical marker, an appropriate advancement flap was drawn incorporating the defect and placing the expected incisions within the relaxed skin tension lines where possible.    The area thus outlined was incised deep to adipose tissue with a #15 scalpel blade.  The skin margins were undermined to an appropriate distance in all directions utilizing iris scissors.
Trilobed Flap Text: The defect edges were debeveled with a #15 scalpel blade.  Given the location of the defect and the proximity to free margins a trilobed flap was deemed most appropriate.  Using a sterile surgical marker, an appropriate trilobed flap drawn around the defect.    The area thus outlined was incised deep to adipose tissue with a #15 scalpel blade.  The skin margins were undermined to an appropriate distance in all directions utilizing iris scissors.
Double O-Z Flap Text: The defect edges were debeveled with a #15 scalpel blade.  Given the location of the defect, shape of the defect and the proximity to free margins a Double O-Z flap was deemed most appropriate.  Using a sterile surgical marker, an appropriate transposition flap was drawn incorporating the defect and placing the expected incisions within the relaxed skin tension lines where possible. The area thus outlined was incised deep to adipose tissue with a #15 scalpel blade.  The skin margins were undermined to an appropriate distance in all directions utilizing iris scissors.
O-T Plasty Text: The defect edges were debeveled with a #15 scalpel blade.  Given the location of the defect, shape of the defect and the proximity to free margins an O-T plasty was deemed most appropriate.  Using a sterile surgical marker, an appropriate O-T plasty was drawn incorporating the defect and placing the expected incisions within the relaxed skin tension lines where possible.    The area thus outlined was incised deep to adipose tissue with a #15 scalpel blade.  The skin margins were undermined to an appropriate distance in all directions utilizing iris scissors.
Ftsg Text: The defect edges were debeveled with a #15 scalpel blade.  Given the location of the defect, shape of the defect and the proximity to free margins a full thickness skin graft was deemed most appropriate.  Using a sterile surgical marker, the primary defect shape was transferred to the donor site. The area thus outlined was incised deep to adipose tissue with a #15 scalpel blade.  The harvested graft was then trimmed of adipose tissue until only dermis and epidermis was left.  The skin margins of the secondary defect were undermined to an appropriate distance in all directions utilizing iris scissors.  The secondary defect was closed with interrupted buried subcutaneous sutures.  The skin edges were then re-apposed with running  sutures.  The skin graft was then placed in the primary defect and oriented appropriately.
Complex Repair And Dorsal Nasal Flap Text: The defect edges were debeveled with a #15 scalpel blade.  The primary defect was closed partially with a complex linear closure.  Given the location of the remaining defect, shape of the defect and the proximity to free margins a dorsal nasal flap was deemed most appropriate for complete closure of the defect.  Using a sterile surgical marker, an appropriate flap was drawn incorporating the defect and placing the expected incisions within the relaxed skin tension lines where possible.    The area thus outlined was incised deep to adipose tissue with a #15 scalpel blade.  The skin margins were undermined to an appropriate distance in all directions utilizing iris scissors.
V-Y Flap Text: The defect edges were debeveled with a #15 scalpel blade.  Given the location of the defect, shape of the defect and the proximity to free margins a V-Y flap was deemed most appropriate.  Using a sterile surgical marker, an appropriate advancement flap was drawn incorporating the defect and placing the expected incisions within the relaxed skin tension lines where possible.    The area thus outlined was incised deep to adipose tissue with a #15 scalpel blade.  The skin margins were undermined to an appropriate distance in all directions utilizing iris scissors.
Dressing: dry sterile dressing
Epidermal Closure: running
Complex Repair And Ftsg Text: The defect edges were debeveled with a #15 scalpel blade.  The primary defect was closed partially with a complex linear closure.  Given the location of the defect, shape of the defect and the proximity to free margins a full thickness skin graft was deemed most appropriate to repair the remaining defect.  The graft was trimmed to fit the size of the remaining defect.  The graft was then placed in the primary defect, oriented appropriately, and sutured into place.
Double O-Z Plasty Text: The defect edges were debeveled with a #15 scalpel blade.  Given the location of the defect, shape of the defect and the proximity to free margins a Double O-Z plasty (double transposition flap) was deemed most appropriate.  Using a sterile surgical marker, the appropriate transposition flaps were drawn incorporating the defect and placing the expected incisions within the relaxed skin tension lines where possible. The area thus outlined was incised deep to adipose tissue with a #15 scalpel blade.  The skin margins were undermined to an appropriate distance in all directions utilizing iris scissors.  Hemostasis was achieved with electrocautery.  The flaps were then transposed into place, one clockwise and the other counterclockwise, and anchored with interrupted buried subcutaneous sutures.

## 2019-05-14 ENCOUNTER — RX ONLY (OUTPATIENT)
Age: 74
Setting detail: RX ONLY
End: 2019-05-14

## 2019-05-14 RX ORDER — CEFADROXIL 500 MG/1
CAPSULE ORAL
Qty: 10 | Refills: 0 | Status: ERX | COMMUNITY
Start: 2019-05-14

## 2019-05-17 ENCOUNTER — RX ONLY (OUTPATIENT)
Age: 74
Setting detail: RX ONLY
End: 2019-05-17

## 2019-05-17 RX ORDER — HYDROCORTISONE 25 MG/G
CREAM TOPICAL
Qty: 1 | Refills: 1 | Status: ERX | COMMUNITY
Start: 2019-05-17

## 2019-05-29 ENCOUNTER — APPOINTMENT (RX ONLY)
Dept: URBAN - METROPOLITAN AREA CLINIC 23 | Facility: CLINIC | Age: 74
Setting detail: DERMATOLOGY
End: 2019-05-29

## 2019-05-29 DIAGNOSIS — Z48.01 ENCOUNTER FOR CHANGE OR REMOVAL OF SURGICAL WOUND DRESSING: ICD-10-CM

## 2019-05-29 PROCEDURE — ? ADDITIONAL NOTES

## 2019-05-29 PROCEDURE — 99024 POSTOP FOLLOW-UP VISIT: CPT

## 2019-05-29 PROCEDURE — ? DRESSING CHANGE

## 2019-05-29 PROCEDURE — ? SUTURE REMOVAL (GLOBAL PERIOD)

## 2019-05-29 ASSESSMENT — LOCATION DETAILED DESCRIPTION DERM: LOCATION DETAILED: LEFT PROXIMAL RADIAL DORSAL FOREARM

## 2019-05-29 ASSESSMENT — LOCATION ZONE DERM: LOCATION ZONE: ARM

## 2019-05-29 ASSESSMENT — LOCATION SIMPLE DESCRIPTION DERM: LOCATION SIMPLE: LEFT FOREARM

## 2019-05-29 NOTE — PROCEDURE: ADDITIONAL NOTES
Additional Notes: Margins were positive and he was referred to  for complete removal of BCC on June 13,2019.
Detail Level: Simple

## 2019-05-29 NOTE — PROCEDURE: SUTURE REMOVAL (GLOBAL PERIOD)
Add 20193 Cpt? (Important Note: In 2017 The Use Of 16700 Is Being Tracked By Cms To Determine Future Global Period Reimbursement For Global Periods): yes
Detail Level: Detailed

## 2019-05-29 NOTE — PROCEDURE: DRESSING CHANGE
Add 64020 Cpt? (Important Note: In 2017 The Use Of 49514 Is Being Tracked By Cms To Determine Future Global Period Reimbursement For Global Periods): no
Detail Level: Detailed

## 2019-10-30 ENCOUNTER — APPOINTMENT (RX ONLY)
Dept: URBAN - METROPOLITAN AREA CLINIC 23 | Facility: CLINIC | Age: 74
Setting detail: DERMATOLOGY
End: 2019-10-30

## 2019-10-30 DIAGNOSIS — L57.0 ACTINIC KERATOSIS: ICD-10-CM

## 2019-10-30 DIAGNOSIS — Z85.828 PERSONAL HISTORY OF OTHER MALIGNANT NEOPLASM OF SKIN: ICD-10-CM

## 2019-10-30 DIAGNOSIS — L21.8 OTHER SEBORRHEIC DERMATITIS: ICD-10-CM

## 2019-10-30 PROCEDURE — ? TREATMENT REGIMEN

## 2019-10-30 PROCEDURE — 17000 DESTRUCT PREMALG LESION: CPT

## 2019-10-30 PROCEDURE — ? ADDITIONAL NOTES

## 2019-10-30 PROCEDURE — ? LIQUID NITROGEN

## 2019-10-30 PROCEDURE — ? COUNSELING

## 2019-10-30 PROCEDURE — 99213 OFFICE O/P EST LOW 20 MIN: CPT | Mod: 25

## 2019-10-30 ASSESSMENT — LOCATION DETAILED DESCRIPTION DERM
LOCATION DETAILED: RIGHT SUPERIOR CENTRAL MALAR CHEEK
LOCATION DETAILED: LEFT VENTRAL LATERAL PROXIMAL FOREARM
LOCATION DETAILED: LEFT MEDIAL FOREHEAD

## 2019-10-30 ASSESSMENT — LOCATION ZONE DERM
LOCATION ZONE: FACE
LOCATION ZONE: ARM

## 2019-10-30 ASSESSMENT — LOCATION SIMPLE DESCRIPTION DERM
LOCATION SIMPLE: LEFT FOREHEAD
LOCATION SIMPLE: RIGHT CHEEK
LOCATION SIMPLE: LEFT FOREARM

## 2019-10-30 NOTE — PROCEDURE: ADDITIONAL NOTES
Additional Notes: Lymph node exam normal. Excision site doing well post radiation and Mohs. Records requested.
Detail Level: Simple

## 2019-10-30 NOTE — PROCEDURE: LIQUID NITROGEN
Detail Level: Detailed
Render Note In Bullet Format When Appropriate: No
Duration Of Freeze Thaw-Cycle (Seconds): 20
Consent: The patient's consent was obtained including but not limited to risks of crusting, scabbing, blistering, scarring, darker or lighter pigmentary change, recurrence, incomplete removal and infection.
Number Of Freeze-Thaw Cycles: 1 freeze-thaw cycle
Render Post-Care Instructions In Note?: yes
Post-Care Instructions: I reviewed with the patient in detail post-care instructions. Patient is to wear sunprotection, and avoid picking at any of the treated lesions. Pt may apply Vaseline to crusted or scabbing areas.

## 2019-11-12 ENCOUNTER — APPOINTMENT (OUTPATIENT)
Dept: PHYSICAL THERAPY | Age: 74
End: 2019-11-12
Attending: NURSE PRACTITIONER
Payer: MEDICARE

## 2019-11-18 ENCOUNTER — HOSPITAL ENCOUNTER (OUTPATIENT)
Dept: PHYSICAL THERAPY | Age: 74
End: 2019-11-18
Attending: NURSE PRACTITIONER
Payer: MEDICARE

## 2019-11-21 ENCOUNTER — HOSPITAL ENCOUNTER (OUTPATIENT)
Dept: PHYSICAL THERAPY | Age: 74
Discharge: HOME | End: 2019-11-21
Attending: NURSE PRACTITIONER
Payer: MEDICARE

## 2019-11-21 PROCEDURE — 97162 PT EVAL MOD COMPLEX 30 MIN: CPT

## 2019-11-21 PROCEDURE — 97110 THERAPEUTIC EXERCISES: CPT

## 2019-11-21 NOTE — THERAPY EVALUATION
Ana Lau  : 1945  [place]  Secondary: [address] [facility]  [address] [facility], [address] [place] [address],[address], [facility] [address].  [phone]   [phone]       OUTPATIENT PHYSICAL THERAPY:Initial Assessment 2019     ICD-10: Treatment Diagnosis: M62.81    Muscle weakness (generalized), R26.2    Difficulty in walking, not elsewhere classified, M54.5 Low back pain  Precautions/Allergies:   Latex and Adhesive   TREATMENT PLAN:  Effective Dates: 2019 TO 2020 (90 days). Frequency/Duration: 2 times a week for 90 Day(s) MEDICAL/REFERRING DIAGNOSIS:  mobility, gait   DATE OF ONSET: chronic with exacerbation over past few months. REFERRING PHYSICIAN: Cherise Mckinley NP MD Orders: Evaluation and treatment. Return MD Appointment:      INITIAL ASSESSMENT:  Mr. Sam Harris presents with complaints of weakness in his knees   PROBLEM LIST (Impacting functional limitations):  1. Decreased Strength  2. Decreased Transfer Abilities  3. Decreased Ambulation Ability/Technique  4. Decreased Balance  5. Increased Pain  6. Decreased Flexibility/Joint Mobility  7. Decreased Charles with Home Exercise Program INTERVENTIONS PLANNED: (Treatment may consist of any combination of the following)  1. Balance Exercise  2. Home Exercise Program (HEP)  3. Manual Therapy  4. Therapeutic Exercise/Strengthening  5. Aquatic     GOALS: (Goals have been discussed and agreed upon with patient.)  Short-Term Functional Goals: Time Frame: 3 weeks  1. Pt indpendent with HEP to address deficits. 2. Pt to transfer sit to stand safely with same technique 3 out of 5 times observed  3. Pt to perform aquatics 45 minutes 1/week consistently  Discharge Goals: Time Frame: 8-12 weeks  1. Improved LE strength by 1/2 to full grade to support ambulation activity. 2. Up down 6 stairs with step over step gait using 1 ue railing.   3. Pt to report low back pain 3/10 or less consistently  4. Pt to improved score on TUG by 3 or greater seconds to decrease risk of falls. 5. Improve LEFS by 10 or more points indicating improved function. OUTCOME MEASURE:   Tool Used: Lower Extremity Functional Scale (LEFS)  Score:  Initial: 14/80 Most Recent: X/80 (Date: -- )   Interpretation of Score: 20 questions each scored on a 5 point scale with 0 representing \"extreme difficulty or unable to perform\" and 4 representing \"no difficulty\". The lower the score, the greater the functional disability. 80/80 represents no disability. Minimal detectable change is 9 points. Tool Used: Timed Up and Go (TUG)  Score:  Initial: 19 seconds Most Recent: X seconds (Date: -- )   Interpretation of Score: The test measures, in seconds, the time taken by an individual to stand up from a standard arm chair (seat height 46 cm [18 in], arm height 65 cm [25.6 in]), walk a distance of 3 meters (118 in, approx 10 ft), turn, walk back to the chair and sit down. If the individual takes longer than 14 seconds to complete TUG, this indicates risk for falls. MEDICAL NECESSITY:   · Patient is expected to demonstrate progress in strength, range of motion and balance to improve safety during ambulation and adl's. .  REASON FOR SERVICES/OTHER COMMENTS:  · Patient continues to require skilled intervention due to LE weakness and decreased ambulation effecting daily activites. Pt is a fall risk and had a fall within the last 3 months. .  Total Duration:       Rehabilitation Potential For Stated Goals: Good  Regarding Elsi Morris's therapy, I certify that the treatment plan above will be carried out by a therapist or under their direction.   Thank you for this referral,    Germain Vuong PT      Referring Physician Signature: Tristan Dennis NP _______________________________ Date _____________       PAIN/SUBJECTIVE:   Initial:   2/10 Post Session:  0/10   HISTORY:   History of Injury/Illness (Reason for Referral): Pt presents with weakness in his knees. He states that without Tramadol he would not be able to walk as his knees get so weak. He states he does not have pain but weakness. He also complains of weak back which he states is due to lack of exercise. He states he cannot exercise because of his knees. Pt states he has occasional pain on the R side of his low back. Pt states his goals are to strengthen his knees, back and arms. Past Medical History/Comorbidities:   Mr. Ronnie Hollis  has a past medical history of CAD (coronary artery disease), Chronic obstructive pulmonary disease ([facility] Depression, Generalized anxiety disorder (11/25/2014), History of basal cell cancer, Hyperlipidemia LDL goal < 70, Insomnia secondary to depression with anxiety (11/25/2014), Lumbar herniated disc (10/17/13), Lumbar spinal stenosis, Nocturnal hypoxemia (12/05/2017), Osteoporosis, Perennial allergic rhinitis with seasonal variation (11/25/2014), Squamous cell carcinoma (12/2018), Subarachnoid hematoma ([facility] (07/08/2014), T12 vertebral fracture ([facility] (1/25/15), Tobacco use disorder, and Vitamin D deficiency (11/25/2014). Mr. Ronnie Hollis  has a past surgical history that includes hx tonsillectomy; hx heart catheterization (4/2005); hx appendectomy; hx hernia repair (Right); hx kyphoplasty (04/2015); hx coronary stent placement (04/2005); hx cataract removal (Bilateral, 03/28/2017 & 04/25/2017); hx cyst removal (1965); and hx mohs procedure (Left, Dec 2018 & June 2019). Social History/Living Environment:     lives with wife who was recently at [place] after a fall  Prior Level of Function/Work/Activity:       Ambulatory/Rehab Services H2 Model Falls Risk Assessment   Risk Factors:       (1)  Gender [Male]       (5)  History of Recent Falls [w/in 3 months] Ability to Rise from Chair:       (1)  Pushes up, successful in one attempt   Falls Prevention Plan: Mobility Assistance Device (specify):  using cane.   has walker at home. Total: (5 or greater = High Risk): 7   ©2010 [facility] . [facility] Patent #[id number]. Federal Law prohibits the replication, distribution or use without written permission from [facility] [employer]   Current Medications:       Current Outpatient Medications:     rosuvastatin (CRESTOR) 20 mg tablet, Take 1 Tab by mouth nightly. Indications: high cholesterol, Disp: 90 Tab, Rfl: 1    QUEtiapine (SEROQUEL) 100 mg tablet, Take 1 Tab by mouth nightly., Disp: 90 Tab, Rfl: 1    fluticasone propionate (FLONASE) 50 mcg/actuation nasal spray, Squirt 2 sprays/nostril once daily as directed, Disp: 3 Bottle, Rfl: 3    fluticasone-umeclidinium-vilanterol (TRELEGY ELLIPTA) 100-62.5-25 mcg inhaler, Take 1 Puff by inhalation daily. , Disp: 3 Inhaler, Rfl: 3    carvedilol (COREG) 6.25 mg tablet, Take 1 Tab by mouth two (2) times daily (with meals). Indications: high blood pressure, Disp: 180 Tab, Rfl: 3    escitalopram oxalate (LEXAPRO) 20 mg tablet, Take 1 Tab by mouth daily. Indications: Anxiousness associated with Depression, Disp: 90 Tab, Rfl: 3    clonazePAM (KLONOPIN) 1 mg tablet, Take  by mouth two (2) times a day., Disp: , Rfl:     OTHER, Indications: perrigo shampo, Disp: , Rfl:     acetaminophen (TYLENOL) 650 mg TbER, Take 650 mg by mouth every eight (8) hours. , Disp: , Rfl:     ascorbic acid, vitamin C, (VITAMIN C) 1,000 mg tablet, Take  by mouth., Disp: , Rfl:     albuterol-ipratropium (DUO-NEB) 2.5 mg-0.5 mg/3 ml nebu, 3 mL by Nebulization route every six (6) hours as needed (wheezing). , Disp: 360 Nebule, Rfl: 3    albuterol (VENTOLIN HFA) 90 mcg/actuation inhaler, Take 2 Puffs by inhalation every six (6) hours as needed for Wheezing or Shortness of Breath., Disp: 1 Inhaler, Rfl: 5    nitroglycerin (NITROSTAT) 0.4 mg SL tablet, 1 Tab by SubLINGual route every five (5) minutes as needed. , Disp: 1 Bottle, Rfl: 3    glucosam/sandy-msm1/C/olga/bosw (OSTEO BI-FLEX TRIPLE STRENGTH PO), Take  by mouth., Disp: , Rfl:     peg 400-propylene glycol (SYSTANE, PROPYLENE GLYCOL,) 0.4-0.3 % drop, as needed. , Disp: , Rfl:     ketoconazole (NIZORAL) 2 % topical cream, Apply  to affected area daily. , Disp: , Rfl:     vit C/E/Zn/coppr/lutein/zeaxan (PRESERVISION AREDS-2 PO), Take  by mouth., Disp: , Rfl:     montelukast (SINGULAIR) 10 mg tablet, Take 1 Tab by mouth daily. , Disp: 90 Tab, Rfl: 3    Nebulizer & Compressor (PORTABLE NEBULIZER SYSTEM) machine, Use as directed. Dx: Mucopurulent Chronic Bronchitis - J44.9, Disp: 1 Each, Rfl: 0    mometasone (ELOCON) 0.1 % ointment, Apply  to affected area daily. , Disp: , Rfl:     guaiFENesin (MUCINEX) 1,200 mg Ta12 ER tablet, Take 1,200 mg by mouth two (2) times a day., Disp: , Rfl:     cyanocobalamin (VITAMIN B12) 500 mcg tablet, Take 500 mcg by mouth daily. , Disp: , Rfl:     coenzyme q10-vitamin e 100-100 mg-unit cap, Take  by mouth., Disp: , Rfl:     sennosides 8.6 mg cap, Take  by mouth daily as needed. , Disp: , Rfl:     calcium citrate-vitamin D3 (CITRACAL + D) tablet, Take 1 Tab by mouth daily. , Disp: , Rfl:     eye lubricant combination no.1 2-0.9-1.8 % drop, Apply 2 Drops to eye daily. Indications: DRY EYE, Disp: , Rfl:     OMEGA-3 FATTY ACIDS (FISH OIL CONCENTRATE PO), Take 2 Cans by mouth daily (after lunch). Indications: hold until after surgery, Disp: , Rfl:     Aspirin, Buffered 81 mg tab, Take  by mouth every morning. Indications: MYOCARDIAL REINFARCTION PREVENTION, PTCA, Disp: , Rfl:     cholecalciferol (VITAMIN D3) 1,000 unit tablet, Take 5,000 Units by mouth daily (after lunch). Indications: PREVENTION OF VITAMIN D DEFICIENCY, Disp: , Rfl:     loratadine (CLARITIN) 10 mg tablet, Take 10 mg by mouth as needed.  Indications: ALLERGIC RHINITIS, Disp: , Rfl:    Date Last Reviewed:  11/21/2019   Number of Personal Factors/Comorbidities that affect the Plan of Care: 1-2: MODERATE COMPLEXITY   EXAMINATION: Observation/Orthostatic Postural Assessment:          Pt ambulates with straight cane with moderately unstable appearing gait. Pt sit to stand out of chair with unreliable technique and could miss the seat quite easily. He states he has done this in the past and fallen. Reports numbness under all toes since low back incident several years ago. ROM:          Trunk-  Flex-finger tips to knee with pain in low back,  Extension- moderate limitation with complaints of stiffness   Side glide- WNL                   B hamstring tightness in sitting- R>L    Strength:          Hip Flex- 4/5 bilat   HS- 4/5  Ant tib  R- 3+/5   L 4/5  Functional Mobility:         Gait/Ambulation:  Ambulates with decreased step length, slow kathryn, side to side sway with straight cane. Transfers:  Pt not careful to move up to seat of chair when transferring stand to sit        Bed Mobility:  Increased time but independent  Balance:          Single leg stance - 4 -5 seconds on each leg       Body Structures Involved:  1. Nerves  2. Joints  3. Muscles Body Functions Affected:  1. Sensory/Pain  2. Neuromusculoskeletal  3. Movement Related Activities and Participation Affected:  1. Mobility  2.  Self Care   Number of elements (examined above) that affect the Plan of Care: 3: MODERATE COMPLEXITY   CLINICAL PRESENTATION:   Presentation: Evolving clinical presentation with changing clinical characteristics: MODERATE COMPLEXITY   CLINICAL DECISION MAKING:   Use of outcome tool(s) and clinical judgement create a POC that gives a: Questionable prediction of patient's progress: MODERATE COMPLEXITY

## 2019-11-21 NOTE — PROGRESS NOTES
Greg Small  : 1945  [place] / [place] / Product Type: Medicare /  [address] at [facility] FRANCIS PINA  [address] [facility], [address], [place] [address].  [phone]   [phone]       OUTPATIENT PHYSICAL THERAPY: Daily Treatment Note 2019  Visit Count: 1     ICD-10: Treatment Diagnosis: M62.81    Muscle weakness (generalized), R26.2    Difficulty in walking, not elsewhere classified, M54.5 Low back pain  Precautions/Allergies:   Latex and Adhesive   TREATMENT PLAN:  Effective Dates: 2019 TO 2020 (90 days). Frequency/Duration: 2 times a week for 90 Day(s) MEDICAL/REFERRING DIAGNOSIS:  mobility, gait   DATE OF ONSET: chronic exacerbated 3 months  REFERRING PHYSICIAN: Artemio Deleon NP MD Orders: eval and treat  Return MD Appointment:        Pre-treatment Symptoms/Complaints:  Weak knees and back  Pain: Initial:   0 Post Session:  0/10   Medications Last Reviewed:  2019    Updated Objective Findings:   See evaluation note from today     TREATMENT:     THERAPEUTIC EXERCISE: (15 minutes):  Exercises per grid below to improve mobility and strength. Required moderate visual, verbal and manual cues to promote proper body alignment, promote proper body posture and promote proper body mechanics. Progressed range and repetitions as indicated. Date:  19 Date:   Date:     Activity/Exercise Parameters Parameters Parameters   Prone prop 10 x 10 sec     Standing trunk ext 10x                                         HEP: as above  [place] [facility]    Treatment/Session Summary:    · Response to Treatment:  pt reported improvement after trunk ext. .  · Communication/Consultation:  None today  · Equipment provided today:  None today  · Recommendations/Intent for next treatment session: Next visit will focus on strengthening and gait with aquatic and land interventions. Will continue to monitor Harika ext for home.     Total Treatment Billable Duration:  15 min therapeutic ex  PT Patient Time In/Time Out  Time In: 0930  Time Out: [address], PT    Future Appointments   Date Time Provider Christina Alston   11/25/2019 11:00 AM Niecy Dura, PT [facility]   12/2/2019 11:00 AM Sultana Theodora Wayne, PT [facility]   12/5/2019 11:00 AM Sultana Theodora Wayne, PT [facility]   12/6/2019  8:00 AM Niecy Dura, PT [facility]   12/6/2019 11:00 AM Cici Dyer MD [facility]   12/9/2019 11:00 AM Niecy Dura, PT [facility]   12/12/2019 11:00 AM Niecy Dura, PT [facility]   12/16/2019 11:00 AM Niecy Dura, PT [facility]   12/19/2019 11:00 AM Niecy Dura, PT [facility]   1/16/2020 11:00 AM Tyesha Londono PA-C [facility]   1/24/2020 10:30 AM Iris Calzada MD [facility]   7/10/2020  2:00 PM Pauly Perkins PA-C [facility]

## 2019-11-25 ENCOUNTER — HOSPITAL ENCOUNTER (OUTPATIENT)
Dept: PHYSICAL THERAPY | Age: 74
End: 2019-11-25
Attending: NURSE PRACTITIONER
Payer: MEDICARE

## 2019-12-02 ENCOUNTER — HOSPITAL ENCOUNTER (OUTPATIENT)
Dept: PHYSICAL THERAPY | Age: 74
End: 2019-12-02
Attending: NURSE PRACTITIONER

## 2019-12-19 ENCOUNTER — APPOINTMENT (OUTPATIENT)
Dept: PHYSICAL THERAPY | Age: 74
End: 2019-12-19
Attending: NURSE PRACTITIONER

## 2020-02-04 ENCOUNTER — HOSPITAL ENCOUNTER (OUTPATIENT)
Dept: CT IMAGING | Age: 75
Discharge: HOME | End: 2020-02-04
Attending: PHYSICIAN ASSISTANT
Payer: MEDICARE

## 2020-02-04 DIAGNOSIS — I71.40 ABDOMINAL AORTIC ANEURYSM (AAA) WITHOUT RUPTURE: ICD-10-CM

## 2020-02-04 LAB — CREAT BLD-MCNC: 0.7 MG/DL (ref 0.8–1.5)

## 2020-02-04 PROCEDURE — 74011000258 HC RX REV CODE- 258: Performed by: PHYSICIAN ASSISTANT

## 2020-02-04 PROCEDURE — 74011636320 HC RX REV CODE- 636/320: Performed by: PHYSICIAN ASSISTANT

## 2020-02-04 PROCEDURE — 82565 ASSAY OF CREATININE: CPT

## 2020-02-04 PROCEDURE — 74174 CTA ABD&PLVS W/CONTRAST: CPT

## 2020-02-04 RX ORDER — SODIUM CHLORIDE 0.9 % (FLUSH) 0.9 %
10 SYRINGE (ML) INJECTION
Status: COMPLETED | OUTPATIENT
Start: 2020-02-04 | End: 2020-02-04

## 2020-02-04 RX ADMIN — IOPAMIDOL 100 ML: 755 INJECTION, SOLUTION INTRAVENOUS at 14:26

## 2020-02-04 RX ADMIN — Medication 10 ML: at 14:26

## 2020-02-04 RX ADMIN — SODIUM CHLORIDE 100 ML: 900 INJECTION, SOLUTION INTRAVENOUS at 14:26

## 2020-02-25 ENCOUNTER — HOSPITAL ENCOUNTER (OUTPATIENT)
Dept: PHYSICAL THERAPY | Age: 75
Discharge: HOME | End: 2020-02-25
Attending: PHYSICIAN ASSISTANT

## 2020-03-02 ENCOUNTER — APPOINTMENT (RX ONLY)
Dept: URBAN - METROPOLITAN AREA CLINIC 23 | Facility: CLINIC | Age: 75
Setting detail: DERMATOLOGY
End: 2020-03-02

## 2020-03-02 DIAGNOSIS — L82.1 OTHER SEBORRHEIC KERATOSIS: ICD-10-CM

## 2020-03-02 DIAGNOSIS — I78.8 OTHER DISEASES OF CAPILLARIES: ICD-10-CM

## 2020-03-02 DIAGNOSIS — L57.0 ACTINIC KERATOSIS: ICD-10-CM

## 2020-03-02 DIAGNOSIS — L73.8 OTHER SPECIFIED FOLLICULAR DISORDERS: ICD-10-CM

## 2020-03-02 DIAGNOSIS — Z85.828 PERSONAL HISTORY OF OTHER MALIGNANT NEOPLASM OF SKIN: ICD-10-CM

## 2020-03-02 DIAGNOSIS — D18.0 HEMANGIOMA: ICD-10-CM

## 2020-03-02 DIAGNOSIS — L21.8 OTHER SEBORRHEIC DERMATITIS: ICD-10-CM | Status: INADEQUATELY CONTROLLED

## 2020-03-02 DIAGNOSIS — L81.4 OTHER MELANIN HYPERPIGMENTATION: ICD-10-CM

## 2020-03-02 PROBLEM — M12.9 ARTHROPATHY, UNSPECIFIED: Status: ACTIVE | Noted: 2020-03-02

## 2020-03-02 PROBLEM — L20.84 INTRINSIC (ALLERGIC) ECZEMA: Status: ACTIVE | Noted: 2020-03-02

## 2020-03-02 PROBLEM — L55.1 SUNBURN OF SECOND DEGREE: Status: ACTIVE | Noted: 2020-03-02

## 2020-03-02 PROBLEM — D18.01 HEMANGIOMA OF SKIN AND SUBCUTANEOUS TISSUE: Status: ACTIVE | Noted: 2020-03-02

## 2020-03-02 PROCEDURE — ? LIQUID NITROGEN

## 2020-03-02 PROCEDURE — ? COUNSELING

## 2020-03-02 PROCEDURE — ? TREATMENT REGIMEN

## 2020-03-02 PROCEDURE — ? OTHER

## 2020-03-02 PROCEDURE — 99213 OFFICE O/P EST LOW 20 MIN: CPT | Mod: 25

## 2020-03-02 PROCEDURE — 17000 DESTRUCT PREMALG LESION: CPT

## 2020-03-02 ASSESSMENT — LOCATION SIMPLE DESCRIPTION DERM
LOCATION SIMPLE: CHEST
LOCATION SIMPLE: NOSE
LOCATION SIMPLE: LEFT UPPER BACK
LOCATION SIMPLE: RIGHT UPPER BACK
LOCATION SIMPLE: LEFT CHEEK
LOCATION SIMPLE: RIGHT SHOULDER
LOCATION SIMPLE: ABDOMEN
LOCATION SIMPLE: LEFT FOREARM
LOCATION SIMPLE: LEFT FOREHEAD

## 2020-03-02 ASSESSMENT — LOCATION DETAILED DESCRIPTION DERM
LOCATION DETAILED: RIGHT SUPERIOR MEDIAL UPPER BACK
LOCATION DETAILED: LEFT MEDIAL FOREHEAD
LOCATION DETAILED: LEFT MID-UPPER BACK
LOCATION DETAILED: LEFT LATERAL SUPERIOR CHEST
LOCATION DETAILED: LEFT VENTRAL LATERAL PROXIMAL FOREARM
LOCATION DETAILED: NASAL DORSUM
LOCATION DETAILED: LEFT SUPERIOR LATERAL BUCCAL CHEEK
LOCATION DETAILED: LEFT SUPERIOR LATERAL UPPER BACK
LOCATION DETAILED: EPIGASTRIC SKIN
LOCATION DETAILED: RIGHT POSTERIOR SHOULDER
LOCATION DETAILED: LEFT INFERIOR CENTRAL MALAR CHEEK

## 2020-03-02 ASSESSMENT — LOCATION ZONE DERM
LOCATION ZONE: TRUNK
LOCATION ZONE: NOSE
LOCATION ZONE: FACE
LOCATION ZONE: ARM

## 2020-03-02 NOTE — PROCEDURE: OTHER
Detail Level: Zone
Note Text (......Xxx Chief Complaint.): This diagnosis correlates with the
Other (Free Text): Pt has several [facility] treated in the past ( [place])

## 2020-03-02 NOTE — PROCEDURE: LIQUID NITROGEN
Number Of Freeze-Thaw Cycles: 1 freeze-thaw cycle
Render Post-Care Instructions In Note?: yes
Post-Care Instructions: I reviewed with the patient in detail post-care instructions. Patient is to wear sunprotection, and avoid picking at any of the treated lesions. Pt may apply Vaseline to crusted or scabbing areas.
Consent: The patient's consent was obtained including but not limited to risks of crusting, scabbing, blistering, scarring, darker or lighter pigmentary change, recurrence, incomplete removal and infection.
Duration Of Freeze Thaw-Cycle (Seconds): 20
Render Note In Bullet Format When Appropriate: No
Detail Level: Detailed

## 2020-03-03 ENCOUNTER — HOSPITAL ENCOUNTER (OUTPATIENT)
Dept: PHYSICAL THERAPY | Age: 75
Discharge: HOME | End: 2020-03-03
Attending: PHYSICIAN ASSISTANT
Payer: MEDICARE

## 2020-03-03 DIAGNOSIS — M48.062 SPINAL STENOSIS OF LUMBAR REGION WITH NEUROGENIC CLAUDICATION: ICD-10-CM

## 2020-03-03 PROCEDURE — 97110 THERAPEUTIC EXERCISES: CPT

## 2020-03-03 PROCEDURE — 97162 PT EVAL MOD COMPLEX 30 MIN: CPT

## 2020-03-03 NOTE — THERAPY EVALUATION
Hai Ma  : 1945  [place]  Secondary: [address] at [address], [address] [place] [address],[address], [address] [facility]  [phone]   [phone]           Mathew Guerin Assessment 3/3/2020   ICD-10: Treatment Diagnosis: Low back pain (M54.5); Spinal stenosis, lumbar region (M48.06)  Precautions/Allergies:   Latex and Adhesive; **Pt IS ALLERGIC TO LATEX  TREATMENT PLAN:  Effective Dates: 3/3/2020 TO 2020 (90 days). Frequency/Duration: 2 times a week for 90 Day(s) MEDICAL/REFERRING DIAGNOSIS:  Spinal stenosis of lumbar region with neurogenic claudication [M48.062]   DATE OF ONSET: Chronic LBP  REFERRING PHYSICIAN: Drew Paz PA-C MD Orders: Evaluate and Treat  Return MD Appointment: Pt has no follow up appt at this time     INITIAL ASSESSMENT:  Mr. Eladio Tavera presents with decreased lumbar ROM, decreased LE strength and increased pain leading to decreased functional status. Pt would benefit from skilled physical therapy services to address the above deficits and help patient return to prior level of function. PROBLEM LIST (Impacting functional limitations):  1. Decreased Strength  2. Decreased ADL/Functional Activities  3. Increased Pain  4. Decreased Flexibility/Joint Mobility  5. Decreased [place] with Home Exercise Program INTERVENTIONS PLANNED: (Treatment may consist of any combination of the following)  1. Cold  2. Cryotherapy  3. Electrical Stimulation  4. Heat  5. Home Exercise Program (HEP)  6. Manual Therapy  7. Range of Motion (ROM)  8. Therapeutic Exercise/Strengthening  9. Ultrasound (US)  10. Aquatic Therapy     GOALS: (Goals have been discussed and agreed upon with patient.)  Short-Term Functional Goals: Time Frame: 4 weeks  1. Pt will increase ROM lumbar flexion = 50 degrees, side bending = 30 degrees bilaterally to assist with household ADL's  2.  Pt will increase strength bilateral LE's 4+/5 to assist with household ADL's  3. Pt will be independent with HEP  Discharge Goals: Time Frame: 12 weeks  1. Pt will increase lumbar ROM flexion = 60 degrees, side bending = 30 degrees bilaterally to assist with household ADL's  2. Pt will increase strength bilateral LE's 5/5 to assist with household ADL's  3. Pt will perform 20 minutes household cleaning activities independently with min to no c/o LBP    OUTCOME MEASURE:   Tool Used: Modified Oswestry Low Back Pain Questionnaire  Score:  Initial: 30/50  Most Recent: X/50 (Date: -- )   Interpretation of Score: Each section is scored on a 0-5 scale, 5 representing the greatest disability. The scores of each section are added together for a total score of 50. MEDICAL NECESSITY:   · Patient is expected to demonstrate progress in strength, range of motion and functional technique to increase independence with household ADL's. · Patient demonstrates good rehab potential due to higher previous functional level. REASON FOR SERVICES/OTHER COMMENTS:  · Patient continues to require skilled intervention due to decreased lumbar ROM, decreased LE strength/flexibility and increased pain leading to decreased functional status. Total Duration:  PT Patient Time In/Time Out  Time In: 1045  Time Out: 1130    Rehabilitation Potential For Stated Goals: Good  Regarding Horace Gitelman Morris's therapy, I certify that the treatment plan above will be carried out by a therapist or under their direction. Thank you for this referral,  Lamin Tom, PT     Referring Physician Signature: Emily Aviles PA-C _______________________________ Date _____________     PAIN/SUBJECTIVE:   Initial:   5/10 Post Session:  4/10   HISTORY:   History of Injury/Illness (Reason for Referral):  Pt reports insidious onset low back pain of approximately 6 months duration. He had a lumbar MRI which revealed a lot of arthritis in his spine/spinal stenosis.   He currently c/o pain in his bilateral low back R>L. Pt c/o pain in his bilateral posterior thighs and lower legs. Aggravating factors include prolonged walking and prolonged sitting/driving. He has difficulties with grocery shopping and performing all household cleaning activities due to his back and LE pain. Pt rates his current pain 5/10 sitting at rest, increasing to 10/10 at worst.  He states his legs feel weak. 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 He states he has had a few falls recently due to his LE weakness. Pt is taking Tylenol and using a \"pain relief cream\" prn for his back pain. Pt also c/o some numbness in the toes of his bilateral feet. Past Medical History/Comorbidities:   Mr. Samantha Ortega  has a past medical history of CAD (coronary artery disease), Chronic obstructive pulmonary disease ([facility] Depression, Generalized anxiety disorder (11/25/2014), History of basal cell cancer, Hyperlipidemia LDL goal < 70, Insomnia secondary to depression with anxiety (11/25/2014), Lumbar herniated disc (10/17/13), Lumbar spinal stenosis, Nocturnal hypoxemia (12/05/2017), Osteoporosis, Perennial allergic rhinitis with seasonal variation (11/25/2014), Squamous cell carcinoma (12/2018), Subarachnoid hematoma ([facility] (07/08/2014), T12 vertebral fracture ([facility] (1/25/15), Tobacco use disorder, and Vitamin D deficiency (11/25/2014). Mr. Samantha Ortega  has a past surgical history that includes hx tonsillectomy; hx heart catheterization (4/2005); hx appendectomy; hx hernia repair (Right); hx kyphoplasty (04/2015); hx coronary stent placement (04/2005); hx cataract removal (Bilateral, 03/28/2017 & 04/25/2017); hx cyst removal (1965); and hx mohs procedure (Left, Dec 2018 & June 2019). Social History/Living Environment:     Pt lives with wife in a 3-story house.   He reports difficulties ascending/descending stairs due to his low back and LE pain  Prior Level of Function/Work/Activity:  Pt is retired  Dominant Side: LEFT  Other Clinical Tests:          MRI lumbar spine revealed arthritis/spinal stenosis per patient  Personal Factors:          Sex:  male        Age:  75 y.o. Profession:  Pt is retired   Ambulatory/Rehab [address] [place] [address] Risk Assessment   Risk Factors:       (1)  Gender [Male]       (5)  History of Recent Falls [w/in 3 months] Ability to Rise from Chair:       (1)  Pushes up, successful in one attempt   Falls Prevention Plan:       Exercise/Equipment Adaptation (specify):  Close SBA to CGA during standing exercise   Total: (5 or greater = High Risk): 7   ©2010 [facility] of [address] Patent #[id number]. Federal Law prohibits the replication, distribution or use without written permission from [facility] [employer]   Current Medications:       Current Outpatient Medications:     nicotine (NICODERM CQ) 21 mg/24 hr, 1 Patch by TransDERmal route every twenty-four (24) hours. , Disp: , Rfl:     traMADol (ULTRAM) 50 mg tablet, Take 1 Tab by mouth two (2) times daily as needed for Pain for up to 30 days. Max Daily Amount: 100 mg., Disp: 60 Tab, Rfl: 2    montelukast (SINGULAIR) 10 mg tablet, Take 1 Tab by mouth daily. , Disp: 90 Tab, Rfl: 3    QUEtiapine (SEROQUEL) 100 mg tablet, Take 1 Tab by mouth nightly., Disp: 90 Tab, Rfl: 1    rosuvastatin (CRESTOR) 20 mg tablet, Take 1 Tab by mouth nightly. Indications: high cholesterol, Disp: 90 Tab, Rfl: 1    fluticasone propionate (FLONASE) 50 mcg/actuation nasal spray, Squirt 2 sprays/nostril once daily as directed (Patient taking differently: 2 Sprays by Both Nostrils route daily. Squirt 2 sprays/nostril once daily as directed), Disp: 3 Bottle, Rfl: 3    fluticasone-umeclidinium-vilanterol (TRELEGY ELLIPTA) 100-62.5-25 mcg inhaler, Take 1 Puff by inhalation daily. , Disp: 3 Inhaler, Rfl: 3    carvedilol (COREG) 6.25 mg tablet, Take 1 Tab by mouth two (2) times daily (with meals).  Indications: high blood pressure, Disp: 180 Tab, Rfl: 3    escitalopram oxalate (LEXAPRO) 20 mg tablet, Take 1 Tab by mouth daily. Indications: Anxiousness associated with Depression, Disp: 90 Tab, Rfl: 3    clonazePAM (KLONOPIN) 1 mg tablet, Take 1 mg by mouth two (2) times a day., Disp: , Rfl:     OTHER, Indications: perrigo shampo, Disp: , Rfl:     acetaminophen (TYLENOL) 650 mg TbER, Take 650 mg by mouth every eight (8) hours. , Disp: , Rfl:     ascorbic acid, vitamin C, (VITAMIN C) 1,000 mg tablet, Take 1,000 mg by mouth daily. , Disp: , Rfl:     albuterol-ipratropium (DUO-NEB) 2.5 mg-0.5 mg/3 ml nebu, 3 mL by Nebulization route every six (6) hours as needed (wheezing). , Disp: 360 Nebule, Rfl: 3    albuterol (VENTOLIN HFA) 90 mcg/actuation inhaler, Take 2 Puffs by inhalation every six (6) hours as needed for Wheezing or Shortness of Breath., Disp: 1 Inhaler, Rfl: 5    nitroglycerin (NITROSTAT) 0.4 mg SL tablet, 1 Tab by SubLINGual route every five (5) minutes as needed. , Disp: 1 Bottle, Rfl: 3    glucosam/sandy-msm1/C/olga/bosw (OSTEO BI-FLEX TRIPLE STRENGTH PO), Take  by mouth., Disp: , Rfl:     peg 400-propylene glycol (SYSTANE, PROPYLENE GLYCOL,) 0.4-0.3 % drop, as needed. , Disp: , Rfl:     ketoconazole (NIZORAL) 2 % topical cream, Apply  to affected area daily. , Disp: , Rfl:     vit C/E/Zn/coppr/lutein/zeaxan (PRESERVISION AREDS-2 PO), Take  by mouth., Disp: , Rfl:     Nebulizer & Compressor (PORTABLE NEBULIZER SYSTEM) machine, Use as directed. Dx: Mucopurulent Chronic Bronchitis - J44.9, Disp: 1 Each, Rfl: 0    mometasone (ELOCON) 0.1 % ointment, Apply  to affected area daily. , Disp: , Rfl:     guaiFENesin (MUCINEX) 1,200 mg Ta12 ER tablet, Take 1,200 mg by mouth two (2) times a day., Disp: , Rfl:     cyanocobalamin (VITAMIN B12) 500 mcg tablet, Take 500 mcg by mouth daily. , Disp: , Rfl:     coenzyme q10-vitamin e 100-100 mg-unit cap, Take  by mouth., Disp: , Rfl:     sennosides 8.6 mg cap, Take 8.6 mg by mouth daily as needed. , Disp: , Rfl:     calcium citrate-vitamin D3 (CITRACAL + D) tablet, Take 1 Tab by mouth daily. , Disp: , Rfl:     eye lubricant combination no.1 2-0.9-1.8 % drop, Apply 2 Drops to eye daily. Indications: DRY EYE, Disp: , Rfl:     OMEGA-3 FATTY ACIDS (FISH OIL CONCENTRATE PO), Take 2 Cans by mouth daily (after lunch). Indications: hold until after surgery, Disp: , Rfl:     Aspirin, Buffered 81 mg tab, Take 81 mg by mouth every morning. Indications: myocardial reinfarction prevention, PTCA, Disp: , Rfl:     cholecalciferol (VITAMIN D3) 1,000 unit tablet, Take 5,000 Units by mouth daily (after lunch). Indications: PREVENTION OF VITAMIN D DEFICIENCY, Disp: , Rfl:     loratadine (CLARITIN) 10 mg tablet, Take 10 mg by mouth as needed. Indications: ALLERGIC RHINITIS, Disp: , Rfl:    Date Last Reviewed:  03-03-20   Number of Personal Factors/Comorbidities that affect the Plan of Care: 1-2: MODERATE COMPLEXITY   EXAMINATION:   Observation/Orthostatic Postural Assessment:           Forward head, rounded shoulders  Palpation:          No tenderness noted to palpation in low back or LE's  ROM:    Lumbar Flexion = 40 degrees (pulling in bilateral low back)  Lumbar Extension = 0 degrees  Lumbar Side Bending R = 20 degrees  Lumbar Side Bending L = 20 degrees    Strength:    R hip flexion = 4/5  R hip abduction = 4/5  R hip adduction = 4+/5  R knee extension = 4/5  R knee flexion = 4/5  R ankle dorsiflexion = 4/5  R ankle plantarflexion = 5/5    L hip flexion = 4/5  L hip abduction = 4+/5  L hip adduction = 5/5  L knee extension = 4+/5  L knee flexion = 4+/5  L ankle dorsiflexion = 4+/5  L ankle plantarflexion = 5/5    Special Tests:          SLR 40 degrees with marked hamstring tightness noted bilaterally   Neurological Screen:        Myotomes:  Weakness bilateral LE's R>L        Dermatomes:  Sensation intact to light touch bilateral LE's        Reflexes:  DTR's 1+ to bilateral patellar and achilles  Functional Mobility:         Gait/Ambulation:  Pt walks with mild antalgic gait         Transfers:  Pt able to transition sit to supine and supine to sit modified independent    Body Structures Involved:  1. Nerves  2. Bones  3. Joints  4. Muscles Body Functions Affected:  1. Sensory/Pain  2. Neuromusculoskeletal Activities and Participation Affected:  1. Mobility  2.  Domestic Life   Number of elements (examined above) that affect the Plan of Care: 3: MODERATE COMPLEXITY   CLINICAL PRESENTATION:   Presentation: Evolving clinical presentation with changing clinical characteristics: MODERATE COMPLEXITY   CLINICAL DECISION MAKING:   Use of outcome tool(s) and clinical judgement create a POC that gives a: Questionable prediction of patient's progress: MODERATE COMPLEXITY

## 2020-03-03 NOTE — PROGRESS NOTES
Shannan Clarity  : 1945  [place]  Secondary: [address] at [place]  [address] [facility], [address] [place] [address],[address] [facility], [place] [address].  [phone]   [phone]         OUTPATIENT PHYSICAL THERAPY: Daily Treatment Note 3/3/2020  Visit Count:  1    ICD-10: Treatment Diagnosis: Low back pain (M54.5); Spinal stenosis, lumbar region (M48.06)  Precautions/Allergies:   Latex and Adhesive; **Pt IS ALLERGIC TO LATEX  TREATMENT PLAN:  Effective Dates: 3/3/2020 TO 2020 (90 days). Frequency/Duration: 2 times a week for 90 Day(s)    Pre-treatment Symptoms/Complaints:  Low Back and Bilateral LE Pain  Pain: Initial:   5/10 Post Session:  4/10   Medications Last Reviewed:  3/3/2020  Updated Objective Findings:  See evaluation note from today  TREATMENT:     THERAPEUTIC EXERCISE: (10 minutes):  Exercises per grid below to improve mobility and strength. Required minimal verbal cues to promote proper body alignment and promote proper body posture. Progressed resistance and repetitions as indicated. Date:  20 Date:   Date:     Activity/Exercise Parameters Parameters Parameters   SKTC 3 reps  15 sec holds     Piriformis Stretch 3 reps  15 sec holds     Pelvic Tilts 10 reps  5 sec holds                                 MedBridge Portal  Treatment/Session Summary:    · Response to Treatment:  Pt tolerated all treatments well today with no c/o. Pt reported decreased pain following treatments today. .  · Communication/Consultation:  None today  · Equipment provided today:  None today  · Recommendations/Intent for next treatment session: Next visit will focus on progression of stretching/strengthening exercises as tolerable.     Total Treatment Billable Duration:  10 minutes  PT Patient Time In/Time Out  Time In: 363 2018  Time Out: [address] PT    Future Appointments   Date Time Provider Christina Alston   3/5/2020 10:15 AM Yates Kehr C, PT [facility] SFE   4/10/2020  9:15 AM MAT LAB [facility] MAT MAT   4/17/2020 10:00 AM Corin Perkins PA-C [facility] MAT MAT   6/9/2020 10:15 AM Kathy Ring MD [facility]CDG UCD   7/10/2020  2:00 PM Ruby Perkins PA-C [facility] MAT MAT

## 2020-03-05 ENCOUNTER — HOSPITAL ENCOUNTER (OUTPATIENT)
Dept: PHYSICAL THERAPY | Age: 75
Discharge: HOME | End: 2020-03-05
Attending: PHYSICIAN ASSISTANT
Payer: MEDICARE

## 2020-03-05 PROCEDURE — 97110 THERAPEUTIC EXERCISES: CPT

## 2020-03-05 NOTE — PROGRESS NOTES
Korin Esteban  : 1945  [place]  Secondary: [address] at [place] [address] [facility], [address], [facility] [address].  [phone]   [phone]         OUTPATIENT PHYSICAL THERAPY: Daily Treatment Note 3/5/2020  Visit Count:  2    ICD-10: Treatment Diagnosis: Low back pain (M54.5); Spinal stenosis, lumbar region (M48.06)  Precautions/Allergies:   Latex and Adhesive; **Pt IS ALLERGIC TO LATEX  TREATMENT PLAN:  Effective Dates: 3/3/2020 TO 2020 (90 days). Frequency/Duration: 2 times a week for 90 Day(s)    Pre-treatment Symptoms/Complaints:  Low Back and Bilateral LE Pain; Pt states his back is feeling better. He rates current pain 0/10. Pain: Initial:   0/10 Post Session:  0/10   Medications Last Reviewed:  3/5/2020  Updated Objective Findings:  None Today  TREATMENT:     THERAPEUTIC EXERCISE: (40 minutes):  Exercises per grid below to improve mobility and strength. Required minimal verbal cues to promote proper body alignment and promote proper body posture. Progressed resistance and repetitions as indicated. MODALITIES: (10 minutes): Moist heat to low back in sitting x10 minutes to decrease pain/soreness. Skin clear afterwards.    Date:  20 Date:  20 Date:     Activity/Exercise Parameters Parameters Parameters   SKTC 3 reps  15 sec holds 3 reps  20 sec holds    Piriformis Stretch 3 reps  15 sec holds     Pelvic Tilts 10 reps  5 sec holds 15 reps  5 sec holds    NuStep  Level 2  6 minutes    Standing Heel Raises, Marching, Hip Abduction, Hip Extension and Knee Flexion  15 reps each  B LE's    Supine Marching  10 reps  B LE's    Hip Adduction with Ball  10 reps  5 sec holds    T-band Hip Abduction  Red T-band  15 reps    T-band Straight Arm Pulldowns  Red T-band  15 reps        MedBridge Portal  Treatment/Session Summary:    · Response to Treatment:  Pt tolerated all treatments well today with no c/o.  Pt reported decreased pain following treatments today. He did well with new treatments. · Communication/Consultation:  None today  · Equipment provided today:  None today  · Recommendations/Intent for next treatment session: Next visit will focus on progression of stretching/strengthening exercises as tolerable.     Total Treatment Billable Duration:  40 minutes  PT Patient Time In/Time Out  Time In: 1015  Time Out: 1105  Ace Smith, PT    Future Appointments   Date Time Provider Christina Alston   3/11/2020 10:15 AM Alessio Walsh, PT [facility] SFE   3/16/2020 11:00 AM Corina Hickman, PTA [facility]   3/18/2020 11:00 AM Corina Stephen, PTA [facility]   3/23/2020 11:00 AM Corina Hickman, PTA [facility]FE   3/25/2020 11:00 AM Alessio Walsh, PT [facility]   3/30/2020 11:00 AM Corina Hickman, PTA [facility]PT SFE   4/1/2020 11:00 AM Corina Hickman, PTA [facility]PT SFE   4/10/2020  9:15 AM MAT LAB [facility] MAT MAT   4/17/2020 10:00 AM Rene Miller PA-C [facility] MAT MAT   6/9/2020 10:15 AM Comfort Perry MD [facility]G UCD   7/10/2020  2:00 PM Mary Perkins PA-C [facility] MAT MAT

## 2020-03-11 ENCOUNTER — HOSPITAL ENCOUNTER (OUTPATIENT)
Dept: PHYSICAL THERAPY | Age: 75
Discharge: HOME | End: 2020-03-11
Attending: PHYSICIAN ASSISTANT
Payer: MEDICARE

## 2020-03-11 NOTE — PROGRESS NOTES
Pt showed up for his physical therapy appt at the wrong time. Will follow up with him at next scheduled appt.     Juan Ramirez, PT

## 2020-03-16 ENCOUNTER — HOSPITAL ENCOUNTER (OUTPATIENT)
Dept: PHYSICAL THERAPY | Age: 75
Discharge: HOME | End: 2020-03-16
Attending: PHYSICIAN ASSISTANT
Payer: MEDICARE

## 2020-03-18 ENCOUNTER — HOSPITAL ENCOUNTER (OUTPATIENT)
Dept: PHYSICAL THERAPY | Age: 75
Discharge: HOME | End: 2020-03-18
Attending: PHYSICIAN ASSISTANT
Payer: MEDICARE

## 2020-03-18 NOTE — PROGRESS NOTES
Patient cx remaining appts secondary to the Covid-19 virus-MD told him to stay home.   Nadiya Viera PTA

## 2020-03-23 ENCOUNTER — APPOINTMENT (OUTPATIENT)
Dept: PHYSICAL THERAPY | Age: 75
End: 2020-03-23
Attending: PHYSICIAN ASSISTANT
Payer: MEDICARE

## 2020-03-25 ENCOUNTER — APPOINTMENT (OUTPATIENT)
Dept: PHYSICAL THERAPY | Age: 75
End: 2020-03-25
Attending: PHYSICIAN ASSISTANT
Payer: MEDICARE

## 2020-03-30 ENCOUNTER — APPOINTMENT (OUTPATIENT)
Dept: PHYSICAL THERAPY | Age: 75
End: 2020-03-30
Attending: PHYSICIAN ASSISTANT
Payer: MEDICARE

## 2020-04-01 ENCOUNTER — APPOINTMENT (OUTPATIENT)
Dept: PHYSICAL THERAPY | Age: 75
End: 2020-04-01
Attending: PHYSICIAN ASSISTANT

## 2020-05-13 ENCOUNTER — HOSPITAL ENCOUNTER (OUTPATIENT)
Dept: PHYSICAL THERAPY | Age: 75
Discharge: HOME | End: 2020-05-13
Attending: PHYSICIAN ASSISTANT

## 2020-06-02 ENCOUNTER — HOSPITAL ENCOUNTER (OUTPATIENT)
Dept: PHYSICAL THERAPY | Age: 75
Discharge: HOME | End: 2020-06-02
Attending: PHYSICIAN ASSISTANT
Payer: MEDICARE

## 2020-06-02 PROCEDURE — 97110 THERAPEUTIC EXERCISES: CPT

## 2020-06-02 NOTE — PROGRESS NOTES
Terrell Evangelista  : 1945  [place]  Secondary: [address]  at [place] [id number] [facility], [address] [place] [address],[address], [place] [address].  [phone]   [phone]         OUTPATIENT PHYSICAL THERAPY: Daily Treatment Note 2020  Visit Count:  3    ICD-10: Treatment Diagnosis: Low back pain (M54.5); Spinal stenosis, lumbar region (M48.06)  Precautions/Allergies:   Latex and Adhesive; **Pt IS ALLERGIC TO LATEX  TREATMENT PLAN:  Effective Dates: 2020 TO 2020 (60 days). Frequency/Duration: 2 times a week for 60 Day(s)    Pre-treatment Symptoms/Complaints:  Low Back and Bilateral LE Pain; Pt states his back is ok this morning, but he still has bad days. Pain: Initial:   0/10 Post Session:  0/10   Medications Last Reviewed:  2020  Updated Objective Findings:  None Today  TREATMENT:     THERAPEUTIC EXERCISE: (40 minutes):  Exercises per grid below to improve mobility and strength. Required minimal verbal cues to promote proper body alignment and promote proper body posture. Progressed resistance and repetitions as indicated. MODALITIES: (0 minutes): Moist heat to low back in sitting x10 minutes to decrease pain/soreness. Skin clear afterwards.    Date:  20 Date:  20 Date:  20   Activity/Exercise Parameters Parameters Parameters   SKTC 3 reps  15 sec holds 3 reps  20 sec holds 3 reps  20 sec holds   Piriformis Stretch 3 reps  15 sec holds     Pelvic Tilts 10 reps  5 sec holds 15 reps  5 sec holds 10 reps  5 sec holds   NuStep  Level 2  6 minutes Level 3  6 minutes   Standing Heel Raises, Marching, Hip Abduction, Hip Extension and Knee Flexion  15 reps each  B LE's 10 reps each  B LE's   Supine Marching  10 reps  B LE's 20 reps  B LE's   Hip Adduction with Ball  10 reps  5 sec holds 15 reps  5 sec holds   T-band Hip Abduction  Red T-band  15 reps LATEX FREE  Red T-band  20 reps   T-band Straight Arm Pulldowns Red T-band  15 reps        MedBridge Portal  Treatment/Session Summary:    · Response to Treatment:  Pt tolerated all treatments well today with no c/o. Pt had improved gait following treatments today. · Communication/Consultation:  None today  · Equipment provided today:  None today  · Recommendations/Intent for next treatment session: Next visit will focus on progression of stretching/strengthening exercises as tolerable.     Total Treatment Billable Duration:  40 minutes  PT Patient Time In/Time Out  Time In: 1015  Time Out: [address], PT    Future Appointments   Date Time Provider Christina Alston   6/4/2020  1:00 PM Ricardo Costa, PT [facility] SFE   7/2/2020 10:30 AM MAT LAB [facility] MAT [facility]   7/10/2020  2:00 PM Erin Royal [facility] MAT [facility]   7/15/2020 10:15 AM MD FRANCISCA Hatch [facility] UCD   8/4/2020  8:00 AM [facility] ULTRASOUND 2 [facility]   8/4/2020  8:30 AM Afia Washburn MD [facility]

## 2020-06-02 NOTE — THERAPY RECERTIFICATION
Janice Villela  : 1945  [place]  Secondary: [address] at [address], [address] [place] [address],[address], [place] [address].  [phone]   [phone]           OUTPATIENT PHYSICAL [address]   ICD-10: Treatment Diagnosis: Low back pain (M54.5); Spinal stenosis, lumbar region (M48.06)  Precautions/Allergies:   Latex and Adhesive; **Pt IS ALLERGIC TO LATEX  TREATMENT PLAN:  Effective Dates: 2020 TO 2020 (60 days). Frequency/Duration: 2 times a week for 60 Day(s) MEDICAL/REFERRING DIAGNOSIS:  Spinal stenosis, lumbar region with neurogenic claudication [M48.062]   DATE OF ONSET: Chronic LBP  REFERRING PHYSICIAN: Chano Holder PA-C MD Orders: Evaluate and Treat  Return MD Appointment: Pt has no follow up appt at this time     INITIAL ASSESSMENT:  Mr. Haseeb Chung attended 2 visits of physical therapy prior to clinic closing due to COVID-19 virus. He now returns for continued therapy. Pt presents with decreased lumbar ROM, decreased LE strength and increased pain leading to decreased functional status. Pt would benefit from skilled physical therapy services to address the above deficits and help patient return to prior level of function. PROBLEM LIST (Impacting functional limitations):  1. Decreased Strength  2. Decreased ADL/Functional Activities  3. Increased Pain  4. Decreased Flexibility/Joint Mobility  5. Decreased [place] with Home Exercise Program INTERVENTIONS PLANNED: (Treatment may consist of any combination of the following)  1. Cold  2. Cryotherapy  3. Electrical Stimulation  4. Heat  5. Home Exercise Program (HEP)  6. Manual Therapy  7. Range of Motion (ROM)  8. Therapeutic Exercise/Strengthening  9. Ultrasound (US)  10. Aquatic Therapy     GOALS: (Goals have been discussed and agreed upon with patient.)  Short-Term Functional Goals: Time Frame: 4 weeks  1.  Pt will increase ROM lumbar flexion = 50 degrees, side bending = 30 degrees bilaterally to assist with household ADL's  2. Pt will increase strength bilateral LE's 4+/5 to assist with household ADL's  3. Pt will be independent with HEP  Discharge Goals: Time Frame: 12 weeks  1. Pt will increase lumbar ROM flexion = 60 degrees, side bending = 30 degrees bilaterally to assist with household ADL's  2. Pt will increase strength bilateral LE's 5/5 to assist with household ADL's  3. Pt will perform 20 minutes household cleaning activities independently with min to no c/o LBP    OUTCOME MEASURE:   Tool Used: Modified Oswestry Low Back Pain Questionnaire  Score:  Initial: 30/50  Most Recent: 32/50 (Date:06-02-20 )   Interpretation of Score: Each section is scored on a 0-5 scale, 5 representing the greatest disability. The scores of each section are added together for a total score of 50. MEDICAL NECESSITY:   · Patient is expected to demonstrate progress in strength, range of motion and functional technique to increase independence with household ADL's. · Patient demonstrates good rehab potential due to higher previous functional level. REASON FOR SERVICES/OTHER COMMENTS:  · Patient continues to require skilled intervention due to decreased lumbar ROM, decreased LE strength/flexibility and increased pain leading to decreased functional status. Total Duration:       Rehabilitation Potential For Stated Goals: Good  Regarding Sofie Morris's therapy, I certify that the treatment plan above will be carried out by a therapist or under their direction.   Thank you for this referral,  Enrique Chaudhry, PT     Referring Physician Signature: Marino Sutherland PA-C _______________________________ Date _____________     PAIN/SUBJECTIVE:   Initial:   0/10 sitting at rest increasing to 3/10 at worst Post Session:  0/10 sitting at rest   HISTORY:   History of Injury/Illness (Reason for Referral):  Pt reports insidious onset low back pain of approximately 6 months duration. He had a lumbar MRI which revealed a lot of arthritis in his spine/spinal stenosis. He currently c/o pain in his bilateral low back R>L. Pt c/o pain in his bilateral posterior thighs and lower legs. Aggravating factors include prolonged walking and prolonged sitting/driving. He has difficulties with grocery shopping and performing all household cleaning activities due to his back and LE pain. Pt rates his current pain 5/10 sitting at rest, increasing to 10/10 at worst.  He states his legs feel weak. 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 He states he has had a few falls recently due to his LE weakness. Pt is taking Tylenol and using a \"pain relief cream\" prn for his back pain. Pt also c/o some numbness in the toes of his bilateral feet. Past Medical History/Comorbidities:   Mr. Bola Bose  has a past medical history of CAD (coronary artery disease), Chronic obstructive pulmonary disease ([facility] Depression, Generalized anxiety disorder (11/25/2014), History of basal cell cancer, Hyperlipidemia LDL goal < 70, Insomnia secondary to depression with anxiety (11/25/2014), Lumbar herniated disc (10/17/13), Lumbar spinal stenosis, Nocturnal hypoxemia (12/05/2017), Osteoporosis, Perennial allergic rhinitis with seasonal variation (11/25/2014), Squamous cell carcinoma (12/2018), Subarachnoid hematoma ([facility] [address] (07/08/2014), T12 vertebral fracture ([facility] [address] [facility] (1/25/15), Tobacco use disorder, and Vitamin D deficiency (11/25/2014). Mr. Bola Bose  has a past surgical history that includes hx tonsillectomy; hx heart catheterization (4/2005); hx appendectomy; hx hernia repair (Right); hx kyphoplasty (04/2015); hx coronary stent placement (04/2005); hx cataract removal (Bilateral, 03/28/2017 & 04/25/2017); hx cyst removal (1965); and hx mohs procedure (Left, Dec 2018 & June 2019). Social History/Living Environment:     Pt lives with wife in a 3-story house.   He reports difficulties ascending/descending stairs due to his low back and LE pain  Prior Level of Function/Work/Activity:  Pt is retired  Dominant Side:         LEFT  Other Clinical Tests:          MRI lumbar spine revealed arthritis/spinal stenosis per patient  Personal Factors:          Sex:  male        Age:  75 y.o. Profession:  Pt is retired   Ambulatory/Rehab [address] [place] [address] Risk Assessment   Risk Factors:       (1)  Gender [Male]       (5)  History of Recent Falls [w/in 3 months] Ability to Rise from Chair:       (1)  Pushes up, successful in one attempt   Falls Prevention Plan:       Exercise/Equipment Adaptation (specify):  Close SBA to CGA during standing exercise   Total: (5 or greater = High Risk): 7   ©2010 [facility] [employer] [address] [place] [address] States Patent #[id number]. Federal Law prohibits the replication, distribution or use without written permission from [facility] [employer]   Current Medications:       Current Outpatient Medications:     traMADoL (ULTRAM) 50 mg tablet, Take 1 Tab by mouth two (2) times daily as needed for Pain for up to 30 days. Max Daily Amount: 100 mg., Disp: 60 Tab, Rfl: 2    QUEtiapine (SEROquel) 100 mg tablet, Take 1 Tab by mouth nightly., Disp: 90 Tab, Rfl: 1    rosuvastatin (CRESTOR) 20 mg tablet, Take 1 Tab by mouth nightly. Indications: high cholesterol, Disp: 90 Tab, Rfl: 1    albuterol (Ventolin HFA) 90 mcg/actuation inhaler, Take 2 Puffs by inhalation every six (6) hours as needed for Wheezing or Shortness of Breath., Disp: 1 Inhaler, Rfl: 5    doxycycline (ADOXA) 100 mg tablet, Take 1 Tab by mouth two (2) times a day., Disp: 20 Tab, Rfl: 0    predniSONE (DELTASONE) 20 mg tablet, Take 2 tablets by mouth daily with breakfast, Disp: 10 Tab, Rfl: 0    nicotine (NICODERM CQ) 21 mg/24 hr, 1 Patch by TransDERmal route every twenty-four (24) hours. , Disp: , Rfl:     montelukast (SINGULAIR) 10 mg tablet, Take 1 Tab by mouth daily. , Disp: 90 Tab, Rfl: 3   fluticasone propionate (FLONASE) 50 mcg/actuation nasal spray, Squirt 2 sprays/nostril once daily as directed (Patient taking differently: 2 Sprays by Both Nostrils route daily. Squirt 2 sprays/nostril once daily as directed), Disp: 3 Bottle, Rfl: 3    fluticasone-umeclidinium-vilanterol (TRELEGY ELLIPTA) 100-62.5-25 mcg inhaler, Take 1 Puff by inhalation daily. , Disp: 3 Inhaler, Rfl: 3    carvedilol (COREG) 6.25 mg tablet, Take 1 Tab by mouth two (2) times daily (with meals). Indications: high blood pressure, Disp: 180 Tab, Rfl: 3    escitalopram oxalate (LEXAPRO) 20 mg tablet, Take 1 Tab by mouth daily. Indications: Anxiousness associated with Depression, Disp: 90 Tab, Rfl: 3    clonazePAM (KLONOPIN) 1 mg tablet, Take 1 mg by mouth two (2) times a day., Disp: , Rfl:     OTHER, Indications: perrigo shampo, Disp: , Rfl:     acetaminophen (TYLENOL) 650 mg TbER, Take 650 mg by mouth every eight (8) hours. , Disp: , Rfl:     ascorbic acid, vitamin C, (VITAMIN C) 1,000 mg tablet, Take 1,000 mg by mouth daily. , Disp: , Rfl:     albuterol-ipratropium (DUO-NEB) 2.5 mg-0.5 mg/3 ml nebu, 3 mL by Nebulization route every six (6) hours as needed (wheezing). , Disp: 360 Nebule, Rfl: 3    nitroglycerin (NITROSTAT) 0.4 mg SL tablet, 1 Tab by SubLINGual route every five (5) minutes as needed. , Disp: 1 Bottle, Rfl: 3    glucosam/sandy-msm1/C/olga/bosw (OSTEO BI-FLEX TRIPLE STRENGTH PO), Take  by mouth., Disp: , Rfl:     peg 400-propylene glycol (SYSTANE, PROPYLENE GLYCOL,) 0.4-0.3 % drop, as needed. , Disp: , Rfl:     ketoconazole (NIZORAL) 2 % topical cream, Apply  to affected area daily. , Disp: , Rfl:     vit C/E/Zn/coppr/lutein/zeaxan (PRESERVISION AREDS-2 PO), Take  by mouth., Disp: , Rfl:     Nebulizer & Compressor (PORTABLE NEBULIZER SYSTEM) machine, Use as directed. Dx: Mucopurulent Chronic Bronchitis - J44.9, Disp: 1 Each, Rfl: 0    mometasone (ELOCON) 0.1 % ointment, Apply  to affected area daily. , Disp: , Rfl:   guaiFENesin (MUCINEX) 1,200 mg Ta12 ER tablet, Take 1,200 mg by mouth two (2) times a day., Disp: , Rfl:     cyanocobalamin (VITAMIN B12) 500 mcg tablet, Take 500 mcg by mouth daily. , Disp: , Rfl:     coenzyme q10-vitamin e 100-100 mg-unit cap, Take  by mouth., Disp: , Rfl:     sennosides 8.6 mg cap, Take 8.6 mg by mouth daily as needed. , Disp: , Rfl:     calcium citrate-vitamin D3 (CITRACAL + D) tablet, Take 1 Tab by mouth daily. , Disp: , Rfl:     eye lubricant combination no.1 2-0.9-1.8 % drop, Apply 2 Drops to eye daily. Indications: DRY EYE, Disp: , Rfl:     OMEGA-3 FATTY ACIDS (FISH OIL CONCENTRATE PO), Take 2 Cans by mouth daily (after lunch). Indications: hold until after surgery, Disp: , Rfl:     Aspirin, Buffered 81 mg tab, Take 81 mg by mouth every morning. Indications: myocardial reinfarction prevention, PTCA, Disp: , Rfl:     cholecalciferol (VITAMIN D3) 1,000 unit tablet, Take 5,000 Units by mouth daily (after lunch). Indications: PREVENTION OF VITAMIN D DEFICIENCY, Disp: , Rfl:     loratadine (CLARITIN) 10 mg tablet, Take 10 mg by mouth as needed. Indications: ALLERGIC RHINITIS, Disp: , Rfl:    Date Last Reviewed:  03-03-20   Number of Personal Factors/Comorbidities that affect the Plan of Care: 1-2: MODERATE COMPLEXITY   EXAMINATION:   Observation/Orthostatic Postural Assessment:           Forward head, rounded shoulders  Palpation:          No tenderness noted to palpation in low back or LE's  ROM:    Lumbar Flexion = 40 degrees (pulling in bilateral low back)  Lumbar Extension = 0 degrees  Lumbar Side Bending R = 20 degrees  Lumbar Side Bending L = 20 degrees    Strength:    R hip flexion = 4/5  R hip abduction = 4/5  R hip adduction = 4+/5  R knee extension = 4/5  R knee flexion = 4/5  R ankle dorsiflexion = 4/5  R ankle plantarflexion = 5/5    L hip flexion = 4/5  L hip abduction = 4+/5  L hip adduction = 5/5  L knee extension = 4+/5  L knee flexion = 4+/5  L ankle dorsiflexion = 4+/5  L ankle plantarflexion = 5/5    Special Tests:          SLR 40 degrees with marked hamstring tightness noted bilaterally   Neurological Screen:        Myotomes:  Weakness bilateral LE's R>L        Dermatomes:  Sensation intact to light touch bilateral LE's        Reflexes:  DTR's 1+ to bilateral patellar and achilles  Functional Mobility:         Gait/Ambulation:  Pt walks with mild antalgic gait         Transfers:  Pt able to transition sit to supine and supine to sit modified independent    Body Structures Involved:  1. Nerves  2. Bones  3. Joints  4. Muscles Body Functions Affected:  1. Sensory/Pain  2. Neuromusculoskeletal Activities and Participation Affected:  1. Mobility  2.  Domestic Life   Number of elements (examined above) that affect the Plan of Care: 3: MODERATE COMPLEXITY   CLINICAL PRESENTATION:   Presentation: Evolving clinical presentation with changing clinical characteristics: MODERATE COMPLEXITY   CLINICAL DECISION MAKING:   Use of outcome tool(s) and clinical judgement create a POC that gives a: Questionable prediction of patient's progress: MODERATE COMPLEXITY

## 2020-06-04 ENCOUNTER — HOSPITAL ENCOUNTER (OUTPATIENT)
Dept: PHYSICAL THERAPY | Age: 75
Discharge: HOME | End: 2020-06-04
Attending: PHYSICIAN ASSISTANT
Payer: MEDICARE

## 2020-06-08 ENCOUNTER — HOSPITAL ENCOUNTER (OUTPATIENT)
Dept: PHYSICAL THERAPY | Age: 75
Discharge: HOME | End: 2020-06-08
Attending: PHYSICIAN ASSISTANT
Payer: MEDICARE

## 2020-06-08 PROCEDURE — 97110 THERAPEUTIC EXERCISES: CPT

## 2020-06-08 NOTE — PROGRESS NOTES
Lilly Lopes  : 1945  [place]  Secondary: [address] at [facility]  [address] [facility] [address], [address], [place] [address].  [phone]   [phone]         OUTPATIENT PHYSICAL THERAPY: Daily Treatment Note 2020  Visit Count:  4    ICD-10: Treatment Diagnosis: Low back pain (M54.5); Spinal stenosis, lumbar region (M48.06)  Precautions/Allergies:   Latex and Adhesive; **Pt IS ALLERGIC TO LATEX  TREATMENT PLAN:  Effective Dates: 2020 TO 2020 (60 days). Frequency/Duration: 2 times a week for 60 Day(s)    Pre-treatment Symptoms/Complaints:  Low Back and Bilateral LE Pain; Pt states he is having no pain this morning. \"My legs just feel weak. \"  Pt got a straight cane that he is using today. Pain: Initial:   0/10 Post Session:  0/10   Medications Last Reviewed:  2020  Updated Objective Findings:  None Today  TREATMENT:     THERAPEUTIC EXERCISE: (40 minutes):  Exercises per grid below to improve mobility and strength. Required minimal verbal cues to promote proper body alignment and promote proper body posture. Progressed resistance and repetitions as indicated. MODALITIES: (0 minutes): Moist heat to low back in sitting x10 minutes to decrease pain/soreness. Skin clear afterwards.    Date:  20 Date:  20 Date:  20 Date:  20   Activity/Exercise Parameters Parameters Parameters    SKTC 3 reps  15 sec holds 3 reps  20 sec holds 3 reps  20 sec holds    Piriformis Stretch 3 reps  15 sec holds      Pelvic Tilts 10 reps  5 sec holds 15 reps  5 sec holds 10 reps  5 sec holds    NuStep  Level 2  6 minutes Level 3  6 minutes Level 4  7 minutes   Standing Heel Raises, Marching, Hip Abduction, Hip Extension and Knee Flexion  15 reps each  B LE's 10 reps each  B LE's 15 reps each  B LE's   Supine Marching  10 reps  B LE's 20 reps  B LE's 20 reps  B LE's   Hip Adduction with Ball  10 reps  5 sec holds 15 reps  5 sec holds    T-band Hip Abduction  Red T-band  15 reps LATEX FREE  Red T-band  20 reps LATEX FREE Red T-band  20 reps   T-band Straight Arm Pulldowns  Red T-band  15 reps     Alternating Toe Taps on Step    15 reps  B LE's   Step-Ups    4 inch step  10 reps  B LE's    Sit to Stands     No UE's  10 reps   Bridging    15 reps       MedBridge Portal  Treatment/Session Summary:    · Response to Treatment:  Pt tolerated all treatments well today with no c/o. Pt had several losses of balance with standing exercises. He was encouraged to continue using his straight cane to help prevent falls. · Communication/Consultation:  None today  · Equipment provided today:  None today  · Recommendations/Intent for next treatment session: Next visit will focus on progression of stretching/strengthening exercises as tolerable.     Total Treatment Billable Duration:  40 minutes  PT Patient Time In/Time Out  Time In: 1015  Time Out: Mariza Mills 303, PT    Future Appointments   Date Time Provider Christina Alston   6/11/2020  9:30 AM Abhi Dailey, PT [facility] SFE   6/15/2020 11:00 AM Abhi Dailey, PT [facility]FE   6/18/2020 10:15 AM Abhi Dailey, PT [facility]   6/23/2020 10:15 AM Abhi Dailey, PT [facility]   6/25/2020 10:15 AM Abhi Dailey, PT [facility]   6/29/2020 11:00 AM Abhi Dailey, PT [facility]   7/2/2020 10:30 AM MAT LAB [facility] MAT [facility]   7/10/2020  2:00 PM Navi Gutierrez PA-C [facility] MAT [facility]   7/15/2020 10:15 AM Ana Maria Chao MD SSA [facility]   8/4/2020  8:00 AM VSA ULTRASOUND 2 [facility]   8/4/2020  8:30 AM MD FRANCISCA Stanton [facility]SA

## 2020-06-11 ENCOUNTER — HOSPITAL ENCOUNTER (OUTPATIENT)
Dept: PHYSICAL THERAPY | Age: 75
Discharge: HOME | End: 2020-06-11
Attending: PHYSICIAN ASSISTANT
Payer: MEDICARE

## 2020-06-11 PROCEDURE — 97110 THERAPEUTIC EXERCISES: CPT

## 2020-06-11 NOTE — PROGRESS NOTES
Adele Sanabria  : 1945  [place]  Secondary: [address] at [address]  [address] [facility], [address] [place] [address],[address], [facility] [address]  [phone]   [phone]         OUTPATIENT PHYSICAL THERAPY: Daily Treatment Note 2020  Visit Count:  5    ICD-10: Treatment Diagnosis: Low back pain (M54.5); Spinal stenosis, lumbar region (M48.06)  Precautions/Allergies:   Latex and Adhesive; **Pt IS ALLERGIC TO LATEX  TREATMENT PLAN:  Effective Dates: 2020 TO 2020 (60 days). Frequency/Duration: 2 times a week for 60 Day(s)    Pre-treatment Symptoms/Complaints:  Low Back and Bilateral LE Pain; Pt states his back is feeling better today. \"I feel like I have more strength in my legs. I squatted down to the floor yesterday and was able to get up. \"  Pain: Initial:   0/10 Post Session:  0/10   Medications Last Reviewed:  2020  Updated Objective Findings:  None Today  TREATMENT:     THERAPEUTIC EXERCISE: (45 minutes):  Exercises per grid below to improve mobility and strength. Required minimal verbal cues to promote proper body alignment and promote proper body posture. Progressed resistance and repetitions as indicated. MODALITIES: (0 minutes): Moist heat to low back in sitting x10 minutes to decrease pain/soreness. Skin clear afterwards.    Date:  20 Date:  20 Date:  20 Date:  20 Date:  20   Activity/Exercise Parameters Parameters Parameters     SKTC 3 reps  15 sec holds 3 reps  20 sec holds 3 reps  20 sec holds     Piriformis Stretch 3 reps  15 sec holds       Pelvic Tilts 10 reps  5 sec holds 15 reps  5 sec holds 10 reps  5 sec holds     NuStep  Level 2  6 minutes Level 3  6 minutes Level 4  7 minutes Level 4  7 minutes   Standing Heel Raises, Marching, Hip Abduction, Hip Extension and Knee Flexion  15 reps each  B LE's 10 reps each  B LE's 15 reps each  B LE's 20 reps each  B LE's   Supine Marching  10 reps  B LE's 20 reps  B LE's 20 reps  B LE's 20 reps  B LE's   Hip Adduction with Ball  10 reps  5 sec holds 15 reps  5 sec holds     T-band Hip Abduction  Red T-band  15 reps LATEX FREE  Red T-band  20 reps LATEX FREE Red T-band  20 reps LATEX FREE  Red T-band  20 reps   T-band Straight Arm Pulldowns  Red T-band  15 reps      Alternating Toe Taps on Step    15 reps  B LE's 4 inch step  15 reps  B LE's   Step-Ups    4 inch step  10 reps  B LE's  4 inch step  10 reps  B LE's   Sit to Stands     No UE's  10 reps No UE's  12 reps   Bridging    15 reps 15 reps   SLR Flexion with TA     10 reps  3 sec holds       MedBridge Portal  Treatment/Session Summary:    · Response to Treatment:  Pt tolerated all treatments well today with no c/o. Pt had improved pain, strength and balance today. · Communication/Consultation:  None today  · Equipment provided today:  None today  · Recommendations/Intent for next treatment session: Next visit will focus on progression of stretching/strengthening exercises as tolerable.     Total Treatment Billable Duration:  45 minutes  PT Patient Time In/Time Out  Time In: 0930  Time Out: GERONIMO Jimenez    Future Appointments   Date Time Provider Christina Alston   6/15/2020 11:00 AM Murtis Jaycee, PT [facility] SFE   6/18/2020 10:15 AM Murtis Jaycee, PT [facility]   6/23/2020 10:15 AM Murtis Jaycee, PT [facility]   6/25/2020 10:15 AM Murtis Jaycee, PT [facility]   6/29/2020 11:00 AM Murtis Jaycee, PT [facility]   7/2/2020 10:30 AM MAT LAB [facility] MAT [facility]   7/10/2020  2:00 PM Coreen Erickson PA-C [facility] MAT [facility]   7/15/2020 10:15 AM MD FRANCISCA Ledezma [facility] UCD   8/4/2020  8:00 AM VSA ULTRASOUND 2 [facility]   8/4/2020  8:30 AM MD FRANCISCA Moore [facility]

## 2020-06-15 ENCOUNTER — HOSPITAL ENCOUNTER (OUTPATIENT)
Dept: PHYSICAL THERAPY | Age: 75
Discharge: HOME | End: 2020-06-15
Attending: PHYSICIAN ASSISTANT
Payer: MEDICARE

## 2020-06-15 PROCEDURE — 97110 THERAPEUTIC EXERCISES: CPT

## 2020-06-15 NOTE — PROGRESS NOTES
Janet Ace  : 1945  [place]  Secondary: [address] at [facility]  [address] [facility], [address] [place] [address],[address], [facility] U. 91.  [phone]   [phone]         OUTPATIENT PHYSICAL THERAPY: Daily Treatment Note 6/15/2020  Visit Count:  6    ICD-10: Treatment Diagnosis: Low back pain (M54.5); Spinal stenosis, lumbar region (M48.06)  Precautions/Allergies:   Latex and Adhesive; **Pt IS ALLERGIC TO LATEX  TREATMENT PLAN:  Effective Dates: 2020 TO 2020 (60 days). Frequency/Duration: 2 times a week for 60 Day(s)    Pre-treatment Symptoms/Complaints:  Low Back and Bilateral LE Pain; Pt states he was stepping off a curb on Friday and didn't have his cane with him and lost his balance and fell. \"I think I've learned my lesson that I need to have my cane with me. \"  Pain: Initial:   0/10 Post Session:  0/10   Medications Last Reviewed:  6/15/2020  Updated Objective Findings:  None Today  TREATMENT:     THERAPEUTIC EXERCISE: (45 minutes):  Exercises per grid below to improve mobility and strength. Required minimal verbal cues to promote proper body alignment and promote proper body posture. Progressed resistance and repetitions as indicated. MODALITIES: (0 minutes): Moist heat to low back in sitting x10 minutes to decrease pain/soreness. Skin clear afterwards.    Date:  20 Date:  20 Date:  20 Date:  20 Date:  06-15-20   Activity/Exercise Parameters Parameters      SKTC 3 reps  20 sec holds 3 reps  20 sec holds      Piriformis Stretch        Pelvic Tilts 15 reps  5 sec holds 10 reps  5 sec holds      NuStep Level 2  6 minutes Level 3  6 minutes Level 4  7 minutes Level 4  7 minutes Level 4  7 minutes   Standing Heel Raises, Marching, Hip Abduction, Hip Extension and Knee Flexion 15 reps each  B LE's 10 reps each  B LE's 15 reps each  B LE's 20 reps each  B LE's 20 reps each  B LE's   Supine Marching 10 reps  B LE's 20 reps  B LE's 20 reps  B LE's 20 reps  B LE's    Hip Adduction with Ball 10 reps  5 sec holds 15 reps  5 sec holds      T-band Hip Abduction Red T-band  15 reps LATEX FREE  Red T-band  20 reps LATEX FREE Red T-band  20 reps LATEX FREE  Red T-band  20 reps    T-band Straight Arm Pulldowns Red T-band  15 reps       Alternating Toe Taps on Step   15 reps  B LE's 4 inch step  15 reps  B LE's 4 inch step  15 reps  B LE's   Step-Ups   4 inch step  10 reps  B LE's  4 inch step  10 reps  B LE's 4 inch step  10 reps  B LE's   Sit to Stands    No UE's  10 reps No UE's  12 reps No UE's  12 reps   Bridging   15 reps 15 reps    SLR Flexion with TA    10 reps  3 sec holds    Stepping over 1/2 Foam Roll     10 reps each LE   T-band Rows     Red T-band  20 reps   T-band Straight Arm Pulldowns     Red T-band  20 reps       MedBridge Portal  Treatment/Session Summary:    · Response to Treatment:  Pt tolerated all treatments well today with no c/o. Pt fatigued at end of session. · Communication/Consultation:  None today  · Equipment provided today:  None today  · Recommendations/Intent for next treatment session: Next visit will focus on progression of stretching/strengthening exercises as tolerable.     Total Treatment Billable Duration:  45 minutes  PT Patient Time In/Time Out  Time In: 1100  Time Out: [address], PT    Future Appointments   Date Time Provider Christina Alston   6/18/2020 10:15 AM Ole De La Rosa, PT [facility] SFE   6/23/2020 10:15 AM Ole Greaser, PT SFEORPT SFE   6/25/2020 10:15 AM Ole Greaser, PT SFEORPT SFE   6/29/2020 11:00 AM Ole Greaser, PT SFEORPT SFE   7/2/2020 10:30 AM [facility]   7/10/2020  2:00 PM ESTUARDO Wilkinson MAT [facility]   7/15/2020 10:15 AM Krys Fischer MD SSA UCDG UCD   8/4/2020  8:00 AM VSA ULTRASOUND 2 FRANCISCA BSMENG VSA   8/4/2020  8:30 AM MD FRANCISCA Peña VSA

## 2020-06-18 ENCOUNTER — HOSPITAL ENCOUNTER (OUTPATIENT)
Dept: PHYSICAL THERAPY | Age: 75
Discharge: HOME | End: 2020-06-18
Attending: PHYSICIAN ASSISTANT
Payer: MEDICARE

## 2020-06-18 PROCEDURE — 97110 THERAPEUTIC EXERCISES: CPT

## 2020-06-18 RX ORDER — KETOCONAZOLE 20 MG/ML
SHAMPOO TOPICAL
Qty: 1 | Refills: 11 | Status: ERX

## 2020-06-18 RX ORDER — KETOCONAZOLE 20 MG/G
CREAM TOPICAL
Qty: 1 | Refills: 11 | Status: ERX

## 2020-06-18 RX ORDER — FLUOCINOLONE ACETONIDE 0.11 MG/ML
OIL TOPICAL
Qty: 1 | Refills: 11 | Status: ERX

## 2020-06-18 NOTE — PROGRESS NOTES
Trae Fallon  : 1945  [place]  Secondary: [address] at [address], [address] [place] [address],[address], [facility] U 91.  [phone]   [phone]         OUTPATIENT PHYSICAL THERAPY: Daily Treatment Note 2020  Visit Count:  7    ICD-10: Treatment Diagnosis: Low back pain (M54.5); Spinal stenosis, lumbar region (M48.06)  Precautions/Allergies:   Latex and Adhesive; **Pt IS ALLERGIC TO LATEX  TREATMENT PLAN:  Effective Dates: 2020 TO 2020 (60 days). Frequency/Duration: 2 times a week for 60 Day(s)    Pre-treatment Symptoms/Complaints:  Low Back and Bilateral LE Pain; Pt states his strength is improving and his back pain is getting less. Pain: Initial:   0/10 Post Session:  0/10   Medications Last Reviewed:  2020  Updated Objective Findings:  None Today  TREATMENT:     THERAPEUTIC EXERCISE: (45 minutes):  Exercises per grid below to improve mobility and strength. Required minimal verbal cues to promote proper body alignment and promote proper body posture. Progressed resistance and repetitions as indicated. MODALITIES: (0 minutes): Moist heat to low back in sitting x10 minutes to decrease pain/soreness. Skin clear afterwards.    Date:  20 Date:  20 Date:  20 Date:  06-15-20 Date:  20   Activity/Exercise Parameters       SKTC 3 reps  20 sec holds       Piriformis Stretch        Pelvic Tilts 10 reps  5 sec holds       NuStep Level 3  6 minutes Level 4  7 minutes Level 4  7 minutes Level 4  7 minutes Level 4  7 minutes   Standing Heel Raises, Marching, Hip Abduction, Hip Extension and Knee Flexion 10 reps each  B LE's 15 reps each  B LE's 20 reps each  B LE's 20 reps each  B LE's 20 reps each  B LE's   Supine Marching 20 reps  B LE's 20 reps  B LE's 20 reps  B LE's     Hip Adduction with Ball 15 reps  5 sec holds       T-band Hip Abduction LATEX FREE  Red T-band  20 reps LATEX FREE Red T-band  20 reps LATEX FREE  Red T-band  20 reps     T-band Straight Arm Pulldowns        Alternating Toe Taps on Step  15 reps  B LE's 4 inch step  15 reps  B LE's 4 inch step  15 reps  B LE's 6 inch step  10 reps  B LE's   Step-Ups  4 inch step  10 reps  B LE's  4 inch step  10 reps  B LE's 4 inch step  10 reps  B LE's 6 inch step  10 reps  B LE's   Sit to Stands   No UE's  10 reps No UE's  12 reps No UE's  12 reps No UE's  12 reps   Bridging  15 reps 15 reps     SLR Flexion with TA   10 reps  3 sec holds     Stepping over 1/2 Foam Roll    10 reps each LE 10 reps each  LE   T-band Rows    Red T-band  20 reps Green T-band  20 reps   T-band Straight Arm Pulldowns    Red T-band  20 reps Green T-band  20 reps       MedBridge Portal  Treatment/Session Summary:    · Response to Treatment:  Pt tolerated all treatments well today with no c/o. LBP improving. Pt with decreased balance today. Pt has improved gait since he started using cane. · Communication/Consultation:  None today  · Equipment provided today:  None today  · Recommendations/Intent for next treatment session: Next visit will focus on progression of stretching/strengthening exercises as tolerable.     Total Treatment Billable Duration:  45 minutes  PT Patient Time In/Time Out  Time In: 1015  Time Out: Mariza Mills [address], PT    Future Appointments   Date Time Provider Christina Alston   6/23/2020 10:15 AM [facility]   6/25/2020 10:15 AM [facility]   6/29/2020 11:00 AM Michelle Thorpe, PT SFEORPT SFE   7/2/2020 10:30 AM [facility]   7/10/2020  2:00 PM ESTUARDO Weinstein [facility]   7/15/2020 10:15 AM MD FRANCISCA Shane UCDG UCD   8/4/2020  8:00 AM VSA ULTRASOUND 2 SSA BSVS VSA   8/4/2020  8:30 AM MD FRANCISCA Luke BSVS VSA

## 2020-06-23 ENCOUNTER — HOSPITAL ENCOUNTER (OUTPATIENT)
Dept: PHYSICAL THERAPY | Age: 75
Discharge: HOME | End: 2020-06-23
Attending: PHYSICIAN ASSISTANT
Payer: MEDICARE

## 2020-06-23 PROCEDURE — 97110 THERAPEUTIC EXERCISES: CPT

## 2020-06-23 NOTE — PROGRESS NOTES
Poppy Brambila  : 1945  [place]  Secondary: [address] at [facility]  [address], [address] [facility],[address], [place] [address] 91.  [phone]   [phone]         OUTPATIENT PHYSICAL THERAPY: Daily Treatment Note 2020  Visit Count:  8    ICD-10: Treatment Diagnosis: Low back pain (M54.5); Spinal stenosis, lumbar region (M48.06)  Precautions/Allergies:   Latex and Adhesive; **Pt IS ALLERGIC TO LATEX  TREATMENT PLAN:  Effective Dates: 2020 TO 2020 (60 days). Frequency/Duration: 2 times a week for 60 Day(s)    Pre-treatment Symptoms/Complaints:  Low Back and Bilateral LE Pain; Pt states his gait and back pain are improving. Pain: Initial:   0/10 Post Session:  0/10   Medications Last Reviewed:  2020  Updated Objective Findings:  None Today  TREATMENT:     THERAPEUTIC EXERCISE: (45 minutes):  Exercises per grid below to improve mobility and strength. Required minimal verbal cues to promote proper body alignment and promote proper body posture. Progressed resistance and repetitions as indicated. MODALITIES: (0 minutes): Moist heat to low back in sitting x10 minutes to decrease pain/soreness. Skin clear afterwards.    Date:  20 Date:  20 Date:  06-15-20 Date:  20 Date:  20   Activity/Exercise        SKTC        Piriformis Stretch        Pelvic Tilts        NuStep Level 4  7 minutes Level 4  7 minutes Level 4  7 minutes Level 4  7 minutes Level 4  6 minutes   Standing Heel Raises, Marching, Hip Abduction, Hip Extension and Knee Flexion 15 reps each  B LE's 20 reps each  B LE's 20 reps each  B LE's 20 reps each  B LE's 1 Lb  20 reps each  B LE's   Supine Marching 20 reps  B LE's 20 reps  B LE's      Hip Adduction with Ball        T-band Hip Abduction LATEX FREE Red T-band  20 reps LATEX FREE  Red T-band  20 reps      T-band Straight Arm Pulldowns        Alternating Toe Taps on Step 15 reps  B LE's 4 inch step  15 reps  B LE's 4 inch step  15 reps  B LE's 6 inch step  10 reps  B LE's 6 inch step  10 reps  B LE's   Step-Ups 4 inch step  10 reps  B LE's  4 inch step  10 reps  B LE's 4 inch step  10 reps  B LE's 6 inch step  10 reps  B LE's 6 inch step  10 reps  B LE's   Sit to Stands  No UE's  10 reps No UE's  12 reps No UE's  12 reps No UE's  12 reps No UE's  12 reps   Bridging 15 reps 15 reps      SLR Flexion with TA  10 reps  3 sec holds      Stepping over 1/2 Foam Roll   10 reps each LE 10 reps each  LE 10 reps each LE   T-band Rows   Red T-band  20 reps Green T-band  20 reps Green T-band  20 reps   T-band Straight Arm Pulldowns   Red T-band  20 reps Green T-band  20 reps Green T-band  20 reps       MedBridge Portal  Treatment/Session Summary:    · Response to Treatment:  Pt tolerated all treatments well today with no c/o. Strength improving. Added weight on standing LE exercises today. · Communication/Consultation:  None today  · Equipment provided today:  None today  · Recommendations/Intent for next treatment session: Next visit will focus on progression of stretching/strengthening exercises as tolerable.     Total Treatment Billable Duration:  45 minutes  PT Patient Time In/Time Out  Time In: 1015  Time Out: Mariza Mills [address], PT    Future Appointments   Date Time Provider Christina Alston   6/25/2020 10:15 AM Carlos Saravia, PT [facility] SFE   6/29/2020 11:00 AM Carlos Saravia PT [facility] SFE   7/2/2020 10:30 AM [facility]   7/10/2020  2:00 PM ESTUARDO Mehta MAT [facility]   7/15/2020 10:15 AM MD FRANCISCA Morales UCDG UCD   8/4/2020  8:00 AM VSA ULTRASOUND 2 SSA BSVS VSA   8/4/2020  8:30 AM MD FRANCISCA Crowe BSVS VSA

## 2020-06-25 ENCOUNTER — HOSPITAL ENCOUNTER (OUTPATIENT)
Dept: PHYSICAL THERAPY | Age: 75
Discharge: HOME | End: 2020-06-25
Attending: PHYSICIAN ASSISTANT
Payer: MEDICARE

## 2020-06-25 PROCEDURE — 97110 THERAPEUTIC EXERCISES: CPT

## 2020-06-25 NOTE — PROGRESS NOTES
Tammy Fischer  : 1945  [place]  Secondary: [address] at [address] [facility], [address] [place] [address],[address], [address] [facility]  [phone]   [phone]         OUTPATIENT PHYSICAL THERAPY: Daily Treatment Note 2020  Visit Count:  9    ICD-10: Treatment Diagnosis: Low back pain (M54.5); Spinal stenosis, lumbar region (M48.06)  Precautions/Allergies:   Latex and Adhesive; **Pt IS ALLERGIC TO LATEX  TREATMENT PLAN:  Effective Dates: 2020 TO 2020 (60 days). Frequency/Duration: 2 times a week for 60 Day(s)    Pre-treatment Symptoms/Complaints:  Low Back and Bilateral LE Pain; Pt states he is tired this morning. Pain: Initial:   0/10 Post Session:  0/10   Medications Last Reviewed:  2020  Updated Objective Findings:  None Today  TREATMENT:     THERAPEUTIC EXERCISE: (45 minutes):  Exercises per grid below to improve mobility and strength. Required minimal verbal cues to promote proper body alignment and promote proper body posture. Progressed resistance and repetitions as indicated. MODALITIES: (0 minutes): Moist heat to low back in sitting x10 minutes to decrease pain/soreness. Skin clear afterwards.    Date:  20 Date:  06-15-20 Date:  20 Date:  20 Date:  20   Activity/Exercise        SKTC        Piriformis Stretch        Pelvic Tilts        NuStep Level 4  7 minutes Level 4  7 minutes Level 4  7 minutes Level 4  6 minutes Level 4  6 minutes   Standing Heel Raises, Marching, Hip Abduction, Hip Extension and Knee Flexion 20 reps each  B LE's 20 reps each  B LE's 20 reps each  B LE's 1 Lb  20 reps each  B LE's 1 Lb  20 reps each  B LE's   Supine Marching 20 reps  B LE's       Hip Adduction with Ball        T-band Hip Abduction LATEX FREE  Red T-band  20 reps       T-band Straight Arm Pulldowns        Alternating Toe Taps on Step 4 inch step  15 reps  B LE's 4 inch step  15 reps  B LE's 6 inch step  10 reps  B LE's 6 inch step  10 reps  B LE's 6 inch  15 reps  B LE's   Step-Ups 4 inch step  10 reps  B LE's 4 inch step  10 reps  B LE's 6 inch step  10 reps  B LE's 6 inch step  10 reps  B LE's 6 inch  10 reps  B LE's   Sit to Stands  No UE's  12 reps No UE's  12 reps No UE's  12 reps No UE's  12 reps No UE's  12 reps   Bridging 15 reps       SLR Flexion with TA 10 reps  3 sec holds       Stepping over 1/2 Foam Roll  10 reps each LE 10 reps each  LE 10 reps each LE 10 reps each LE   T-band Rows  Red T-band  20 reps Green T-band  20 reps Green T-band  20 reps Green T-band  20 reps   T-band Straight Arm Pulldowns  Red T-band  20 reps Green T-band  20 reps Green T-band  20 reps Green T-band  20 reps   Standing on Blue Foam     Eyes Open   & Closed       MedBridge Portal  Treatment/Session Summary:    · Response to Treatment:  Pt tolerated all treatments well today with no c/o. Pt had increased endurance today requiring less rest breaks despite c/o feeling tired today. Back pain and balance improving. · Communication/Consultation:  None today  · Equipment provided today:  None today  · Recommendations/Intent for next treatment session: Next visit will focus on progression of stretching/strengthening exercises as tolerable.     Total Treatment Billable Duration:  45 minutes  PT Patient Time In/Time Out  Time In: 1000  Time Out: Rodolfo Holloway PT    Future Appointments   Date Time Provider Christina Alston   6/29/2020 11:00 AM Marla Jasso PT [facility] SFE   7/2/2020 10:30 AM MAT LAB FRANCISCA MAT [facility]   7/2/2020  2:30 PM GERONIMO VillalpandoE   7/7/2020  9:30 AM GERONIMO VillalpandoE   7/9/2020  9:30 AM GERONIMO VillalpandoEORPT SFE   7/10/2020  2:00 PM ESTUARDO Santana [facility]   7/14/2020 11:00 AM GERONIMO VillalpandoE   7/15/2020 10:15 AM Elsa Cochran MD SSA UCDG UCCARLOS   7/16/2020 10:15 AM Marla Jasso PT SFEGLADIS BOYER   8/4/2020  8:00 AM VSBOBBY ULTRASOUND 2 [facility] DELICIA VO   8/4/2020  8:30 AM MD FRANCISCA Stearns

## 2020-06-29 ENCOUNTER — HOSPITAL ENCOUNTER (OUTPATIENT)
Dept: PHYSICAL THERAPY | Age: 75
Discharge: HOME | End: 2020-06-29
Attending: PHYSICIAN ASSISTANT
Payer: MEDICARE

## 2020-07-02 ENCOUNTER — HOSPITAL ENCOUNTER (OUTPATIENT)
Dept: PHYSICAL THERAPY | Age: 75
Discharge: HOME | End: 2020-07-02
Attending: PHYSICIAN ASSISTANT
Payer: MEDICARE

## 2020-07-07 ENCOUNTER — HOSPITAL ENCOUNTER (OUTPATIENT)
Dept: PHYSICAL THERAPY | Age: 75
Discharge: HOME | End: 2020-07-07
Attending: PHYSICIAN ASSISTANT
Payer: MEDICARE

## 2020-07-07 PROCEDURE — 97110 THERAPEUTIC EXERCISES: CPT

## 2020-07-07 NOTE — PROGRESS NOTES
Laney Durbin  : 1945  [place]  Secondary: [address] at [place]  [address] [facility], [address] [place] [address],[address], [address] [facility]  [phone]   [phone]         OUTPATIENT PHYSICAL THERAPY: Daily Treatment Note 2020  Visit Count:  10    ICD-10: Treatment Diagnosis: Low back pain (M54.5); Spinal stenosis, lumbar region (M48.06)  Precautions/Allergies:   Latex and Adhesive; **Pt IS ALLERGIC TO LATEX  TREATMENT PLAN:  Effective Dates: 2020 TO 2020 (60 days). Frequency/Duration: 2 times a week for 60 Day(s)    Pre-treatment Symptoms/Complaints:  Low Back and Bilateral LE Pain; Pt states he is fatigued from being sick last week. Pain: Initial:   0/10 Post Session:  0/10   Medications Last Reviewed:  2020  Updated Objective Findings:  None Today  TREATMENT:     THERAPEUTIC EXERCISE: (40 minutes):  Exercises per grid below to improve mobility and strength. Required minimal verbal cues to promote proper body alignment and promote proper body posture. Progressed resistance and repetitions as indicated. MODALITIES: (0 minutes): Moist heat to low back in sitting x10 minutes to decrease pain/soreness. Skin clear afterwards.    Date:  20 Date:  20 Date:  20 Date:  20   Activity/Exercise       SKTC       Piriformis Stretch       Pelvic Tilts       NuStep Level 4  7 minutes Level 4  6 minutes Level 4  6 minutes Level 4  6 minutes   Standing Heel Raises, Marching, Hip Abduction, Hip Extension and Knee Flexion 20 reps each  B LE's 1 Lb  20 reps each  B LE's 1 Lb  20 reps each  B LE's 1 Lb  20 reps  B LE's   Supine Marching       Hip Adduction with Ball       T-band Hip Abduction       T-band Straight Arm Pulldowns       Alternating Toe Taps on Step 6 inch step  10 reps  B LE's 6 inch step  10 reps  B LE's 6 inch  15 reps  B LE's 6 inch step  10 reps  B LE's   Step-Ups 6 inch step  10 reps  B LE's 6 inch step  10 reps  B LE's 6 inch  10 reps  B LE's 6 inch step  10 reps  B LE's   Sit to Stands  No UE's  12 reps No UE's  12 reps No UE's  12 reps No UE's  12 reps   Bridging       SLR Flexion with TA       Stepping over 1/2 Foam Roll 10 reps each  LE 10 reps each LE 10 reps each LE    T-band Rows Green T-band  20 reps Green T-band  20 reps Green T-band  20 reps Green T-band  20 reps   T-band Straight Arm Pulldowns Green T-band  20 reps Green T-band  20 reps Green T-band  20 reps Green T-band  20 reps   Standing on Blue Foam   Eyes Open   & Closed        MedBridge Portal  Treatment/Session Summary:    · Response to Treatment:  Pt did fairly well today. He required more frequent rest breaks today secondary fatigue. Progress note next visit. · Communication/Consultation:  None today  · Equipment provided today:  None today  · Recommendations/Intent for next treatment session: Next visit will focus on progression of stretching/strengthening exercises as tolerable.     Total Treatment Billable Duration:  40 minutes  PT Patient Time In/Time Out  Time In: 0930  Time Out: 1010  Demetrius Potter PT    Future Appointments   Date Time Provider Christina Alston   7/9/2020  9:30 AM Elaina Turner PT [facility] SFE   7/10/2020  2:00 PM Nicole ESPINOSA [facility]   7/14/2020 11:00 AM Elaina Turner PT SFEORPT SFE   7/16/2020 10:15 AM Elaina Turner PT [facility] SFE   7/22/2020 11:30 AM MD FRANCISCA Vasques UCDG UCD   8/4/2020  8:00 AM VSA ULTRASOUND 2 [facility] BSVS VSA   8/4/2020  8:30 AM MD FRANCISCA Lyon BSVS VSA

## 2020-07-09 ENCOUNTER — HOSPITAL ENCOUNTER (OUTPATIENT)
Dept: PHYSICAL THERAPY | Age: 75
Discharge: HOME | End: 2020-07-09
Attending: PHYSICIAN ASSISTANT
Payer: MEDICARE

## 2020-07-09 PROCEDURE — 97110 THERAPEUTIC EXERCISES: CPT

## 2020-07-09 NOTE — PROGRESS NOTES
Fernanda Huizar  : 1945  [place]  Secondary: [address] at [place] [address] [facility], [address] [place] [address],[address] [facility], [place] [address].  [phone]   [phone]         OUTPATIENT PHYSICAL THERAPY: Daily Treatment Note 2020  Visit Count:  11    ICD-10: Treatment Diagnosis: Low back pain (M54.5); Spinal stenosis, lumbar region (M48.06)  Precautions/Allergies:   Latex and Adhesive; **Pt IS ALLERGIC TO LATEX  TREATMENT PLAN:  Effective Dates: 2020 TO 2020 (60 days). Frequency/Duration: 2 times a week for 60 Day(s)    Pre-treatment Symptoms/Complaints:  Low Back and Bilateral LE Pain; Pt states he is sore today. Pain: Initial:   0/10 Post Session:  0/10   Medications Last Reviewed:  2020  Updated Objective Findings:  See progress note from today  TREATMENT:     THERAPEUTIC EXERCISE: (40 minutes):  Exercises per grid below to improve mobility and strength. Required minimal verbal cues to promote proper body alignment and promote proper body posture. Progressed resistance and repetitions as indicated. MODALITIES: (0 minutes): Moist heat to low back in sitting x10 minutes to decrease pain/soreness. Skin clear afterwards.    Date:  20 Date:  20 Date:  20 Date:  20 Date:  20   Activity/Exercise        SKTC        Piriformis Stretch        Pelvic Tilts        NuStep Level 4  7 minutes Level 4  6 minutes Level 4  6 minutes Level 4  6 minutes Level 5  7 minutes   Standing Heel Raises, Marching, Hip Abduction, Hip Extension and Knee Flexion 20 reps each  B LE's 1 Lb  20 reps each  B LE's 1 Lb  20 reps each  B LE's 1 Lb  20 reps  B LE's 1 Lb  20 reps  B LE's   Supine Marching        Hip Adduction with Ball        T-band Hip Abduction        Alternating Toe Taps on Step 6 inch step  10 reps  B LE's 6 inch step  10 reps  B LE's 6 inch  15 reps  B LE's 6 inch step  10 reps  B LE's 6 inch step  10 reps  B LE's   Step-Ups 6 inch step  10 reps  B LE's 6 inch step  10 reps  B LE's 6 inch  10 reps  B LE's 6 inch step  10 reps  B LE's 6 inch step  10 reps  B LE's   Sit to Stands  No UE's  12 reps No UE's  12 reps No UE's  12 reps No UE's  12 reps No UE's  15 reps   Bridging        SLR Flexion with TA        Stepping over 1/2 Foam Roll 10 reps each  LE 10 reps each LE 10 reps each LE  10 reps each LE   T-band Rows Green T-band  20 reps Green T-band  20 reps Green T-band  20 reps Green T-band  20 reps    T-band Straight Arm Pulldowns Green T-band  20 reps Green T-band  20 reps Green T-band  20 reps Green T-band  20 reps    Standing on Blue Foam   Eyes Open   & Closed         MedBridge Portal  Treatment/Session Summary:    · Response to Treatment:  Pt did well today. Improved strength and ROM since last measurements taken. · Communication/Consultation:  None today  · Equipment provided today:  None today  · Recommendations/Intent for next treatment session: Next visit will focus on progression of stretching/strengthening exercises as tolerable.     Total Treatment Billable Duration:  40 minutes  PT Patient Time In/Time Out  Time In: 0930  Time Out: GERONIMO Jimenez    Future Appointments   Date Time Provider Christina Alston   7/10/2020  2:00 PM Mai ESPINOSA [facility]   7/14/2020 11:00 AM Thelma Hill, PT [facility] SFE   7/16/2020 10:15 AM Thelma Hill PT [facility] SFE   7/22/2020 11:30 AM MD FRANCISCA Lipscomb UCDG UCD   8/4/2020  8:00 AM VSA ULTRASOUND 2 FRANCISCA BSVS VSA   8/4/2020  8:30 AM MD FRANCISCA Stevenson BSMENG VSA

## 2020-07-14 ENCOUNTER — HOSPITAL ENCOUNTER (OUTPATIENT)
Dept: PHYSICAL THERAPY | Age: 75
Discharge: HOME | End: 2020-07-14
Attending: PHYSICIAN ASSISTANT
Payer: MEDICARE

## 2020-07-14 PROCEDURE — 97110 THERAPEUTIC EXERCISES: CPT

## 2020-07-14 NOTE — PROGRESS NOTES
Alba Ray  : 1945  [place]  Secondary: [address] at [address], [address] [place] [address],[address], [address] [facility]  [phone]   [phone]         OUTPATIENT PHYSICAL THERAPY: Daily Treatment Note 2020  Visit Count:  1    ICD-10: Treatment Diagnosis: Low back pain (M54.5); Spinal stenosis, lumbar region (M48.06)  Precautions/Allergies:   Latex and Adhesive; **Pt IS ALLERGIC TO LATEX  TREATMENT PLAN:  Effective Dates: 2020 TO 2020 (60 days). Frequency/Duration: 2 times a week for 60 Day(s)    Pre-treatment Symptoms/Complaints:  Low Back and Bilateral LE Pain; Pt states he is not having any back pain today and his leg strength is improving. Pain: Initial:   0/10 Post Session:  0/10   Medications Last Reviewed:  2020  Updated Objective Findings:  None Today  TREATMENT:     THERAPEUTIC EXERCISE: (40 minutes):  Exercises per grid below to improve mobility and strength. Required minimal verbal cues to promote proper body alignment and promote proper body posture. Progressed resistance and repetitions as indicated. MODALITIES: (0 minutes): Moist heat to low back in sitting x10 minutes to decrease pain/soreness. Skin clear afterwards.    Date:  20 Date:  20 Date:  20 Date:  20 Date:  20   Activity/Exercise        SKTC        Piriformis Stretch        Pelvic Tilts        NuStep Level 4  6 minutes Level 4  6 minutes Level 4  6 minutes Level 5  7 minutes Level 5  7 minutes   Standing Heel Raises, Marching, Hip Abduction, Hip Extension and Knee Flexion 1 Lb  20 reps each  B LE's 1 Lb  20 reps each  B LE's 1 Lb  20 reps  B LE's 1 Lb  20 reps  B LE's 2 Lbs  20 reps each  B LE's   Supine Marching        Hip Adduction with Ball        T-band Hip Abduction        Alternating Toe Taps on Step 6 inch step  10 reps  B LE's 6 inch  15 reps  B LE's 6 inch step  10 reps  B LE's 6 inch step  10 reps  B LE's 6 inch step  10 reps  B LE's   Step-Ups 6 inch step  10 reps  B LE's 6 inch  10 reps  B LE's 6 inch step  10 reps  B LE's 6 inch step  10 reps  B LE's 6 inch step  10 reps  B LE's   Sit to Stands  No UE's  12 reps No UE's  12 reps No UE's  12 reps No UE's  15 reps No UE's  15 reps   Bridging        SLR Flexion with TA        Stepping over 1/2 Foam Roll 10 reps each LE 10 reps each LE  10 reps each LE 10 reps each  LE   T-band Rows Green T-band  20 reps Green T-band  20 reps Green T-band  20 reps  Green T-band  20 reps   T-band Straight Arm Pulldowns Green T-band  20 reps Green T-band  20 reps Green T-band  20 reps  Green T-band  20 reps   Standing on Blue Foam  Eyes Open   & Closed          MedBridge Portal  Treatment/Session Summary:    · Response to Treatment:  Pt tolerated all treatments well today with no c/o. Pain improving. · Communication/Consultation:  None today  · Equipment provided today:  None today  · Recommendations/Intent for next treatment session: Next visit will focus on progression of stretching/strengthening exercises as tolerable.     Total Treatment Billable Duration:  40 minutes  PT Patient Time In/Time Out  Time In: 1100  Time Out: [address], PT    Future Appointments   Date Time Provider Christina Alston   7/16/2020 10:15 AM Alisia Christian, GERONIMO [facility] SFE   7/22/2020 11:30 AM Sue Mukherjee MD S[facility]CDG UCD   8/4/2020  8:00 AM VSA ULTRASOUND 2 [facility] BSVS VSA   8/4/2020  8:30 AM Renetta Ac MD S[facility] BSVS VSA   11/10/2020  9:30 AM MAT LAB [facility] MAT [facility]   11/17/2020 10:00 AM Bia Perkins PA-C [facility] MAT [facility]

## 2020-07-16 ENCOUNTER — HOSPITAL ENCOUNTER (OUTPATIENT)
Dept: PHYSICAL THERAPY | Age: 75
Discharge: HOME | End: 2020-07-16
Attending: PHYSICIAN ASSISTANT
Payer: MEDICARE

## 2020-07-16 PROCEDURE — 97110 THERAPEUTIC EXERCISES: CPT

## 2020-07-16 NOTE — PROGRESS NOTES
Xavier Later  : 1945  [place]  Secondary: [address] at [facility]  [address] [facility], [address] [place] [address],[address], [facility] [address]  [phone]   [phone]         OUTPATIENT PHYSICAL THERAPY: Daily Treatment Note 2020  Visit Count:  2    ICD-10: Treatment Diagnosis: Low back pain (M54.5); Spinal stenosis, lumbar region (M48.06)  Precautions/Allergies:   Latex and Adhesive; **Pt IS ALLERGIC TO LATEX  TREATMENT PLAN:  Effective Dates: 2020 TO 2020 (60 days). Frequency/Duration: 2 times a week for 60 Day(s)    Pre-treatment Symptoms/Complaints:  Low Back and Bilateral LE Pain; Pt states he is improving. Pain: Initial:   0/10 Post Session:  0/10   Medications Last Reviewed:  2020  Updated Objective Findings:  None Today  TREATMENT:     THERAPEUTIC EXERCISE: (40 minutes):  Exercises per grid below to improve mobility and strength. Required minimal verbal cues to promote proper body alignment and promote proper body posture. Progressed resistance and repetitions as indicated. MODALITIES: (0 minutes): Moist heat to low back in sitting x10 minutes to decrease pain/soreness. Skin clear afterwards.    Date:  20 Date:  20 Date:  20 Date:  20 Date:  20   Activity/Exercise        SKTC        Piriformis Stretch        Pelvic Tilts        NuStep Level 4  6 minutes Level 4  6 minutes Level 5  7 minutes Level 5  7 minutes Level 5  7 minutes   Standing Heel Raises, Marching, Hip Abduction, Hip Extension and Knee Flexion 1 Lb  20 reps each  B LE's 1 Lb  20 reps  B LE's 1 Lb  20 reps  B LE's 2 Lbs  20 reps each  B LE's 2 Lbs  20 reps each  B LE's   Supine Marching        Hip Adduction with Ball        T-band Hip Abduction        Alternating Toe Taps on Step 6 inch  15 reps  B LE's 6 inch step  10 reps  B LE's 6 inch step  10 reps  B LE's 6 inch step  10 reps  B LE's 6 inch step  10 reps  B LE's Step-Ups 6 inch  10 reps  B LE's 6 inch step  10 reps  B LE's 6 inch step  10 reps  B LE's 6 inch step  10 reps  B LE's 6 inch step  10 reps  B LE's   Sit to Stands  No UE's  12 reps No UE's  12 reps No UE's  15 reps No UE's  15 reps No UE's  15 reps   Bridging     15 reps   SLR Flexion with TA        Stepping over 1/2 Foam Roll 10 reps each LE  10 reps each LE 10 reps each  LE 10 reps each LE   T-band Rows Green T-band  20 reps Green T-band  20 reps  Green T-band  20 reps Green T-band  20 reps   T-band Straight Arm Pulldowns Green T-band  20 reps Green T-band  20 reps  Green T-band  20 reps Green T-band  20 reps   Standing on Blue Foam Eyes Open   & Closed           MedBridge Portal  Treatment/Session Summary:    · Response to Treatment:  Pt tolerated all treatments well today with no c/o. Pt continuing to make steady progress. · Communication/Consultation:  None today  · Equipment provided today:  None today  · Recommendations/Intent for next treatment session: Next visit will focus on progression of stretching/strengthening exercises as tolerable.     Total Treatment Billable Duration:  40 minutes  PT Patient Time In/Time Out  Time In: 1015  Time Out: [address], PT    Future Appointments   Date Time Provider Christina Alecia   7/22/2020 11:30 AM Sue Mukherjee MD [facility]CD[facility] UCD   8/4/2020  8:00 AM VSA ULTRASOUND 2 [facility] BSVS VSA   8/4/2020  8:30 AM Renetta Ac MD [facility]SVS VSA   11/10/2020  9:30 AM MAT LAB [facility] MAT BSCP   11/17/2020 10:00 AM Bia Perkins PA-C [facility] MAT [facility]

## 2020-07-23 ENCOUNTER — HOSPITAL ENCOUNTER (OUTPATIENT)
Dept: PHYSICAL THERAPY | Age: 75
Discharge: HOME | End: 2020-07-23
Attending: PHYSICIAN ASSISTANT
Payer: MEDICARE

## 2020-07-23 PROCEDURE — 97110 THERAPEUTIC EXERCISES: CPT

## 2020-07-23 NOTE — PROGRESS NOTES
Anthony Guan  : 1945  [place]  Secondary: [address] at [place] [id number] [facility], [address] [place] [address],[address], [address] [facility]  [phone]   [phone]         OUTPATIENT PHYSICAL THERAPY: Daily Treatment Note 2020  Visit Count:  14    ICD-10: Treatment Diagnosis: Low back pain (M54.5); Spinal stenosis, lumbar region (M48.06)  Precautions/Allergies:   Latex and Adhesive; **Pt IS ALLERGIC TO LATEX  TREATMENT PLAN:  Effective Dates: 2020 TO 2020 (60 days). Frequency/Duration: 2 times a week for 60 Day(s)    Pre-treatment Symptoms/Complaints:  Low Back and Bilateral LE Pain; Pt states he is improving. \"I am doing good\"  Pain: Initial:   0/10 stiffness Post Session:  0/10   Medications Last Reviewed:  2020  Updated Objective Findings:  None Today  TREATMENT:     THERAPEUTIC EXERCISE: (40 minutes):  Exercises per grid below to improve mobility and strength. Required minimal verbal cues to promote proper body alignment and promote proper body posture. Progressed resistance and repetitions as indicated. MODALITIES: (0 minutes): Moist heat to low back in sitting x10 minutes to decrease pain/soreness. Skin clear afterwards.    Date:  20 Date:  20 Date:  20   Activity/Exercise      SKTC      Piriformis Stretch      Pelvic Tilts      NuStep Level 5  7 minutes Level 5  7 minutes Level 5  8 minutes   Standing Heel Raises, Marching, Hip Abduction, Hip Extension and Knee Flexion 1 Lb  20 reps  B LE's 2 Lbs  20 reps each  B LE's 2 Lbs  20 reps each  B LE's   Supine Marching      Hip Adduction with Ball      T-band Hip Abduction      Alternating Toe Taps on Step 6 inch step  10 reps  B LE's 6 inch step  10 reps  B LE's 6 inch step  10 reps  B LE's one hand on rail   Step-Ups 6 inch step  10 reps  B LE's 6 inch step  10 reps  B LE's 6 inch step  10 reps  B LE's   Sit to Stands  No UE's  15 reps No UE's  15 reps No UE's  15 reps   Bridging   15 reps   SLR Flexion with TA      Stepping over 1/2 Foam Roll 10 reps each LE 10 reps each  LE 10 reps each LE   T-band Rows  Green T-band  20 reps Green T-band  20 reps   T-band Straight Arm Pulldowns  Green T-band  20 reps Green T-band  20 reps   Standing on Blue Foam          MedBridge Portal  Treatment/Session Summary:    · Response to Treatment:  Pt tolerated all treatments well today with no c/o. Pt continuing to make steady progress with PT.  · Communication/Consultation:  None today  · Equipment provided today:  None today  · Recommendations/Intent for next treatment session: Next visit will focus on progression of stretching/strengthening exercises as tolerable.     Total Treatment Billable Duration:  45 minutes  PT Patient Time In/Time Out  Time In: 0930  Time Out: [address], PTA    Future Appointments   Date Time Provider Christina Alston   7/28/2020  8:45 AM Neil Cordero, PT [facility] SFE   7/30/2020  8:45 AM Neil Cordero, PT [facility] SFE   8/4/2020  8:00 AM VSA ULTRASOUND 2 [facility] BSVS VSA   8/4/2020  8:30 AM Jazmin Haas MD [facility] BSVS VSA   8/4/2020 11:00 AM Neil Cordero, PT [facility] SFE   8/6/2020  9:30 AM Neil Cordero, PT [facility] SFE   11/10/2020  9:30 AM MAT LAB [facility] MAT BSCP   11/17/2020 10:00 AM Ross Perkins PA-C [facility]

## 2020-07-28 ENCOUNTER — HOSPITAL ENCOUNTER (OUTPATIENT)
Dept: PHYSICAL THERAPY | Age: 75
Discharge: HOME | End: 2020-07-28
Attending: PHYSICIAN ASSISTANT
Payer: MEDICARE

## 2020-07-28 NOTE — PROGRESS NOTES
Pt cancelled his physical therapy appt for today (greater than 24 hours in advance).     Akila Weiss, PT

## 2020-07-30 ENCOUNTER — HOSPITAL ENCOUNTER (OUTPATIENT)
Dept: PHYSICAL THERAPY | Age: 75
Discharge: HOME | End: 2020-07-30
Attending: PHYSICIAN ASSISTANT
Payer: MEDICARE

## 2020-07-30 PROCEDURE — 97110 THERAPEUTIC EXERCISES: CPT

## 2020-07-30 NOTE — PROGRESS NOTES
Ravin Goel  : 1945  [place]  Secondary: [address] at [facility], [address], [place] [address]  [phone]   [phone]         OUTPATIENT PHYSICAL THERAPY: Daily Treatment Note 2020  Visit Count:  15    ICD-10: Treatment Diagnosis: Low back pain (M54.5); Spinal stenosis, lumbar region (M48.06)  Precautions/Allergies:   Latex and Adhesive; **Pt IS ALLERGIC TO LATEX  TREATMENT PLAN:  Effective Dates: 2020 TO 2020 (60 days). Frequency/Duration: 2 times a week for 60 Day(s)    Pre-treatment Symptoms/Complaints:  Low Back and Bilateral LE Pain; Pt states he is feeling pretty good today. Pain: Initial:   0/10 stiffness Post Session:  0/10   Medications Last Reviewed:  2020  Updated Objective Findings:  None Today  TREATMENT:     THERAPEUTIC EXERCISE: (40 minutes):  Exercises per grid below to improve mobility and strength. Required minimal verbal cues to promote proper body alignment and promote proper body posture. Progressed resistance and repetitions as indicated. MODALITIES: (0 minutes): Moist heat to low back in sitting x10 minutes to decrease pain/soreness. Skin clear afterwards.    Date:  20 Date:  20 Date:  20 Date:  20   Activity/Exercise       SKTC       Piriformis Stretch       Pelvic Tilts       NuStep Level 5  7 minutes Level 5  7 minutes Level 5  8 minutes Level 5  8 minutes   Standing Heel Raises, Marching, Hip Abduction, Hip Extension and Knee Flexion 1 Lb  20 reps  B LE's 2 Lbs  20 reps each  B LE's 2 Lbs  20 reps each  B LE's 2 Lbs  20 reps each  B LE's   Supine Marching       Hip Adduction with Ball       T-band Hip Abduction       Alternating Toe Taps on Step 6 inch step  10 reps  B LE's 6 inch step  10 reps  B LE's 6 inch step  10 reps  B LE's one hand on rail 6 inch step  10 reps  B LE's    Step-Ups 6 inch step  10 reps  B LE's 6 inch step  10 reps  B LE's 6 inch step  10 reps  B LE's 6 inch step  10 reps  B LE's   Sit to Stands  No UE's  15 reps No UE's  15 reps No UE's  15 reps No UE's  15 reps   Bridging   15 reps    SLR Flexion with TA       Stepping over 1/2 Foam Roll 10 reps each LE 10 reps each  LE 10 reps each LE 10 reps each  LE   T-band Rows  Green T-band  20 reps Green T-band  20 reps Green T-band  20 reps   T-band Straight Arm Pulldowns  Green T-band  20 reps Green T-band  20 reps Green T-band  20 reps   Standing on Blue Foam           MedBridge Portal  Treatment/Session Summary:    · Response to Treatment:  Pt tolerated all treatments well today with no c/o. Pain and overall function improving. · Communication/Consultation:  None today  · Equipment provided today:  None today  · Recommendations/Intent for next treatment session: Next visit will focus on progression of stretching/strengthening exercises as tolerable.     Total Treatment Billable Duration:  40 minutes  PT Patient Time In/Time Out  Time In: 0845  Time Out: Aston Lr, PT    Future Appointments   Date Time Provider Christina Alecia   8/4/2020  8:00 AM VSA ULTRASOUND 2 [facility] BS VSA   8/4/2020  8:30 AM Sebastián Leyva MD [facility] BSVS VSA   8/4/2020 11:00 AM Elizabeth Escort, PT [facility] SFE   8/6/2020  9:30 AM Elizabeth Escort, PT S[facility]PT SFE   11/10/2020  9:30 AM MAT LAB [facility] MAT BSCP   11/17/2020 10:00 AM Augustine cA PA-C [facility]   1/20/2021 11:15 AM Shayy Ureña MD [facility] UC UCD

## 2020-08-04 ENCOUNTER — HOSPITAL ENCOUNTER (OUTPATIENT)
Dept: PHYSICAL THERAPY | Age: 75
Discharge: HOME | End: 2020-08-04
Attending: PHYSICIAN ASSISTANT
Payer: MEDICARE

## 2020-08-06 ENCOUNTER — HOSPITAL ENCOUNTER (OUTPATIENT)
Dept: PHYSICAL THERAPY | Age: 75
Discharge: HOME | End: 2020-08-06
Attending: PHYSICIAN ASSISTANT
Payer: MEDICARE

## 2020-08-06 PROCEDURE — 97110 THERAPEUTIC EXERCISES: CPT

## 2020-08-06 NOTE — THERAPY RECERTIFICATION
Adebayo Watters  : 1945  [place]  Secondary: [address] at [address] [facility] [address] [place] [address], [facility] [address] [facility].  [phone]   [phone]           OUTPATIENT PHYSICAL [address]    ICD-10: Treatment Diagnosis: Low back pain (M54.5); Spinal stenosis, lumbar region (M48.06)  Precautions/Allergies:   Latex and Adhesive; **Pt IS ALLERGIC TO LATEX  TREATMENT PLAN:  Effective Dates: 2020 TO 2020 (60 days). Frequency/Duration: 2 times a week for 60 Day(s) MEDICAL/REFERRING DIAGNOSIS:  Spinal stenosis, lumbar region with neurogenic claudication [M48.062]   DATE OF ONSET: Chronic LBP  REFERRING PHYSICIAN: Fany Moore PA-C MD Orders: Evaluate and Treat  Return MD Appointment: Pt has no follow up appt at this time     INITIAL ASSESSMENT:  Mr. Wagner Pennington attended 16 visits of physical therapy from 20 to 20 with increased lumbar ROM, LE strength and balance. Pt would benefit from continued skilled physical therapy services for continued strengthening and balance activities to help return to prior level of function. PROBLEM LIST (Impacting functional limitations):  1. Decreased Strength  2. Decreased ADL/Functional Activities  3. Increased Pain  4. Decreased Flexibility/Joint Mobility  5. Decreased [place] with Home Exercise Program INTERVENTIONS PLANNED: (Treatment may consist of any combination of the following)  1. Balance Exercise  2. Cold  3. Cryotherapy  4. Electrical Stimulation  5. Heat  6. Home Exercise Program (HEP)  7. Manual Therapy  8. Range of Motion (ROM)  9. Therapeutic Exercise/Strengthening  10. Ultrasound (US)  11. Aquatic Therapy     GOALS: (Goals have been discussed and agreed upon with patient.)  Short-Term Functional Goals: Time Frame: 4 weeks  1.  Pt will increase ROM lumbar flexion = 50 degrees, side bending = 30 degrees bilaterally to assist with household ADL's (Goal Met)  2. Pt will increase strength bilateral LE's 4+/5 to assist with household ADL's (Goal Met)  3. Pt will be independent with HEP (Goal Met)  Discharge Goals: Time Frame: 12 weeks  1. Pt will increase lumbar ROM flexion = 60 degrees, side bending = 30 degrees bilaterally to assist with household ADL's  2. Pt will increase strength bilateral LE's 5/5 to assist with household ADL's  3. Pt will perform 20 minutes household cleaning activities independently with min to no c/o LBP    OUTCOME MEASURE:   Tool Used: Modified Oswestry Low Back Pain Questionnaire  Score:  Initial: 30/50  Most Recent: 8/50 (Date:08-06-20 )   Interpretation of Score: Each section is scored on a 0-5 scale, 5 representing the greatest disability. The scores of each section are added together for a total score of 50. MEDICAL NECESSITY:   · Patient is expected to demonstrate progress in strength, range of motion and functional technique to increase independence with household ADL's. · Patient demonstrates good rehab potential due to higher previous functional level. REASON FOR SERVICES/OTHER COMMENTS:  · Patient continues to require skilled intervention due to decreased lumbar ROM, decreased LE strength/flexibility and increased pain leading to decreased functional status. Total Duration:       Rehabilitation Potential For Stated Goals: Good  Regarding Nicole Morris's therapy, I certify that the treatment plan above will be carried out by a therapist or under their direction. Thank you for this referral,  Berta Deleon, PT     Referring Physician Signature: Karl Wilcox PA-C _______________________________ Date _____________     PAIN/SUBJECTIVE:   Initial:   0/10 sitting at rest increasing to 3/10 at worst Post Session:  0/10 sitting at rest   HISTORY:   History of Injury/Illness (Reason for Referral):  Pt reports insidious onset low back pain of approximately 6 months duration.   He had a lumbar MRI which revealed a lot of arthritis in his spine/spinal stenosis. He currently c/o pain in his bilateral low back R>L. Pt c/o pain in his bilateral posterior thighs and lower legs. Aggravating factors include prolonged walking and prolonged sitting/driving. He has difficulties with grocery shopping and performing all household cleaning activities due to his back and LE pain. Pt rates his current pain 5/10 sitting at rest, increasing to 10/10 at worst.  He states his legs feel weak. 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 He states he has had a few falls recently due to his LE weakness. Pt is taking Tylenol and using a \"pain relief cream\" prn for his back pain. Pt also c/o some numbness in the toes of his bilateral feet. Past Medical History/Comorbidities:   Mr. 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 Mr. Chang Peace  has a past surgical history that includes hx tonsillectomy; hx heart catheterization (4/2005); hx appendectomy; hx hernia repair (Right); hx kyphoplasty (04/2015); hx coronary stent placement (04/2005); hx cataract removal (Bilateral, 03/28/2017 & 04/25/2017); hx cyst removal (1965); and hx mohs procedure (Left, Dec 2018 & June 2019). Social History/Living Environment:     Pt lives with wife in a 3-story house.   He reports difficulties ascending/descending stairs due to his low back and LE pain  Prior Level of Function/Work/Activity:  Pt is retired  Dominant Side:         LEFT  Other Clinical Tests:          MRI lumbar spine revealed arthritis/spinal stenosis per patient  Personal Factors:          Sex:  male        Age:  75 y.o. Profession:  Pt is retired   Ambulatory/Rehab [address] [place] [address] Risk Assessment   Risk Factors:       (1)  Gender [Male]       (5)  History of Recent Falls [w/in 3 months] Ability to Rise from Chair:       (1)  Pushes up, successful in one attempt   Falls Prevention Plan:       Exercise/Equipment Adaptation (specify):  Close SBA to CGA during standing exercise   Total: (5 or greater = High Risk): 7   ©2010 [facility] [address] [place] [address] States Patent #[id number]. Federal Law prohibits the replication, distribution or use without written permission from [facility]   Current Medications:       Current Outpatient Medications:     traMADoL (ULTRAM) 50 mg tablet, TAKE 1 TABLET BY MOUTH TWICE DAILY AS NEEDED FOR PAIN, Disp: , Rfl:     OTHER, Prevagen, Disp: , Rfl:     rosuvastatin (CRESTOR) 20 mg tablet, Take 1 Tab by mouth nightly. Indications: high cholesterol, Disp: 90 Tab, Rfl: 1    carvediloL (COREG) 6.25 mg tablet, Take 1 Tab by mouth two (2) times daily (with meals). Indications: high blood pressure, Disp: 180 Tab, Rfl: 3    escitalopram oxalate (Lexapro) 20 mg tablet, Take 1 Tab by mouth daily. Indications: anxiousness associated with depression, Disp: 90 Tab, Rfl: 3    QUEtiapine (SEROquel) 100 mg tablet, Take 1 Tab by mouth nightly., Disp: 90 Tab, Rfl: 1    fluticasone propionate (FLONASE) 50 mcg/actuation nasal spray, Squirt 2 sprays/nostril once daily as directed (Patient taking differently: 2 Sprays by Both Nostrils route daily.  Squirt 2 sprays/nostril once daily as directed), Disp: 3 Bottle, Rfl: 3    albuterol-ipratropium (DUO-NEB) 2.5 mg-0.5 mg/3 ml nebu, 3 mL by Nebulization route every six (6) hours as needed (wheezing). , Disp: 360 Nebule, Rfl: 3    albuterol (Ventolin HFA) 90 mcg/actuation inhaler, Take 2 Puffs by inhalation every six (6) hours as needed for Wheezing or Shortness of Breath., Disp: 1 Inhaler, Rfl: 5    montelukast (SINGULAIR) 10 mg tablet, Take 1 Tab by mouth daily. , Disp: 90 Tab, Rfl: 3    fluticasone-umeclidinium-vilanterol (TRELEGY ELLIPTA) 100-62.5-25 mcg inhaler, Take 1 Puff by inhalation daily. , Disp: 3 Inhaler, Rfl: 3    clonazePAM (KLONOPIN) 1 mg tablet, Take 1 mg by mouth two (2) times a day., Disp: , Rfl:     OTHER, Indications: perrigo shampo, Disp: , Rfl:     acetaminophen (TYLENOL) 650 mg TbER, Take 650 mg by mouth every eight (8) hours. , Disp: , Rfl:     nitroglycerin (NITROSTAT) 0.4 mg SL tablet, 1 Tab by SubLINGual route every five (5) minutes as needed. , Disp: 1 Bottle, Rfl: 3    glucosam/sandy-msm1/C/olga/bosw (OSTEO BI-FLEX TRIPLE STRENGTH PO), Take  by mouth daily. , Disp: , Rfl:     peg 400-propylene glycol (SYSTANE, PROPYLENE GLYCOL,) 0.4-0.3 % drop, as needed. , Disp: , Rfl:     ketoconazole (NIZORAL) 2 % topical cream, Apply  to affected area daily. , Disp: , Rfl:     vit C/E/Zn/coppr/lutein/zeaxan (PRESERVISION AREDS-2 PO), Take  by mouth., Disp: , Rfl:     Nebulizer & Compressor (PORTABLE NEBULIZER SYSTEM) machine, Use as directed. Dx: Mucopurulent Chronic Bronchitis - J44.9, Disp: 1 Each, Rfl: 0    mometasone (ELOCON) 0.1 % ointment, Apply  to affected area daily. , Disp: , Rfl:     guaiFENesin (MUCINEX) 1,200 mg Ta12 ER tablet, Take 1,200 mg by mouth two (2) times a day., Disp: , Rfl:     cyanocobalamin (VITAMIN B12) 500 mcg tablet, Take 500 mcg by mouth daily. , Disp: , Rfl:     coenzyme q10-vitamin e 100-100 mg-unit cap, Take  by mouth daily. , Disp: , Rfl:     sennosides 8.6 mg cap, Take 8.6 mg by mouth daily as needed. , Disp: , Rfl:     calcium citrate-vitamin D3 (CITRACAL + D) tablet, Take 1 Tab by mouth daily. , Disp: , Rfl:     eye lubricant combination no.1 2-0.9-1.8 % drop, Apply 2 Drops to eye daily. Indications: DRY EYE, Disp: , Rfl:     OMEGA-3 FATTY ACIDS (FISH OIL CONCENTRATE PO), Take 2 Cans by mouth daily (after lunch). Indications: hold until after surgery, Disp: , Rfl:     Aspirin, Buffered 81 mg tab, Take 81 mg by mouth every morning. Indications: myocardial reinfarction prevention, PTCA, Disp: , Rfl:     cholecalciferol (VITAMIN D3) 1,000 unit tablet, Take 5,000 Units by mouth daily (after lunch). Indications: PREVENTION OF VITAMIN D DEFICIENCY, Disp: , Rfl:     loratadine (CLARITIN) 10 mg tablet, Take 10 mg by mouth as needed. Indications: ALLERGIC RHINITIS, Disp: , Rfl:    Date Last Reviewed:  03-03-20   Number of Personal Factors/Comorbidities that affect the Plan of Care: 1-2: MODERATE COMPLEXITY   EXAMINATION:   Observation/Orthostatic Postural Assessment:           Forward head, rounded shoulders  Palpation:          No tenderness noted to palpation in low back or LE's  ROM:    Lumbar Flexion = 55 degrees (pulling in bilateral low back)  Lumbar Extension = 0 degrees  Lumbar Side Bending R = 30 degrees  Lumbar Side Bending L = 30 degrees    Strength:    R hip flexion = 4+5  R hip abduction = 4+/5  R hip adduction = 5/5  R knee extension = 5/5  R knee flexion = 5/5  R ankle dorsiflexion = 5/5  R ankle plantarflexion = 5/5    L hip flexion = 4+/5  L hip abduction = 4+/5  L hip adduction = 5/5  L knee extension = 5/5  L knee flexion = 5/5  L ankle dorsiflexion = 5/5  L ankle plantarflexion = 5/5    Special Tests:          SLR 40 degrees with marked hamstring tightness noted bilaterally   Neurological Screen:        Myotomes:  Weakness bilateral LE's R>L        Dermatomes:  Sensation intact to light touch bilateral LE's        Reflexes:  DTR's 1+ to bilateral patellar and achilles  Functional Mobility:         Gait/Ambulation:  Pt walks with mild antalgic gait Transfers:  Pt able to transition sit to supine and supine to sit modified independent    Body Structures Involved:  1. Nerves  2. Bones  3. Joints  4. Muscles Body Functions Affected:  1. Sensory/Pain  2. Neuromusculoskeletal Activities and Participation Affected:  1. Mobility  2.  Domestic Life   Number of elements (examined above) that affect the Plan of Care: 3: MODERATE COMPLEXITY   CLINICAL PRESENTATION:   Presentation: Evolving clinical presentation with changing clinical characteristics: MODERATE COMPLEXITY   CLINICAL DECISION MAKING:   Use of outcome tool(s) and clinical judgement create a POC that gives a: Questionable prediction of patient's progress: MODERATE COMPLEXITY

## 2020-08-06 NOTE — PROGRESS NOTES
Luiza Encarnacion  : 1945  [place]  Secondary: [address] at [address] [facility]  [facility], [address] [place] [address], [address] [facility]  [phone]   [phone]         OUTPATIENT PHYSICAL THERAPY: Daily Treatment Note 2020  Visit Count:  16    ICD-10: Treatment Diagnosis: Low back pain (M54.5); Spinal stenosis, lumbar region (M48.06)  Precautions/Allergies:   Latex and Adhesive; **Pt IS ALLERGIC TO LATEX  TREATMENT PLAN:  Effective Dates: 2020 TO 2020 (60 days). Frequency/Duration: 2 times a week for 60 Day(s)    Pre-treatment Symptoms/Complaints:  Low Back and Bilateral LE Pain; Pt states he is feeling pretty good today. Pain: Initial:   0/10 stiffness Post Session:  0/10   Medications Last Reviewed:  2020  Updated Objective Findings:  None Today  TREATMENT:     THERAPEUTIC EXERCISE: (40 minutes):  Exercises per grid below to improve mobility and strength. Required minimal verbal cues to promote proper body alignment and promote proper body posture. Progressed resistance and repetitions as indicated. MODALITIES: (0 minutes): Moist heat to low back in sitting x10 minutes to decrease pain/soreness. Skin clear afterwards.    Date:  20 Date:  20 Date:  20 Date:  20 Date:  20   Activity/Exercise        [facility]        Piriformis Stretch        Pelvic Tilts        NuStep Level 5  7 minutes Level 5  7 minutes Level 5  8 minutes Level 5  8 minutes Level 5  8 minutes   Standing Heel Raises, Marching, Hip Abduction, Hip Extension and Knee Flexion 1 Lb  20 reps  B LE's 2 Lbs  20 reps each  B LE's 2 Lbs  20 reps each  B LE's 2 Lbs  20 reps each  B LE's 2.5 Lbs   20 reps each  B LE's   Supine Marching        Hip Adduction with Ball        T-band Hip Abduction        Alternating Toe Taps on Step 6 inch step  10 reps  B LE's 6 inch step  10 reps  B LE's 6 inch step  10 reps  B LE's one hand on rail 6 inch step  10 reps  B LE's  6 inch step  10 reps  B LE's   Step-Ups 6 inch step  10 reps  B LE's 6 inch step  10 reps  B LE's 6 inch step  10 reps  B LE's 6 inch step  10 reps  B LE's 6 inch step  10 reps  B LE's   Sit to Stands  No UE's  15 reps No UE's  15 reps No UE's  15 reps No UE's  15 reps    Bridging   15 reps     SLR Flexion with TA        Stepping over 1/2 Foam Roll 10 reps each LE 10 reps each  LE 10 reps each LE 10 reps each  LE 10 reps each LE   T-band Rows  Green T-band  20 reps Green T-band  20 reps Green T-band  20 reps    T-band Straight Arm Pulldowns  Green T-band  20 reps Green T-band  20 reps Green T-band  20 reps    Standing on Blue Foam            MedBridge Portal  Treatment/Session Summary:    · Response to Treatment:  Pt tolerated all treatments well today with no c/o. Pain and strength continue to improve. · Communication/Consultation:  None today  · Equipment provided today:  None today  · Recommendations/Intent for next treatment session: Next visit will focus on progression of stretching/strengthening exercises as tolerable.     Total Treatment Billable Duration:  40 minutes  PT Patient Time In/Time Out  Time In: 0930  Time Out: GERONIMO Jimenez    Future Appointments   Date Time Provider Christina Alston   8/13/2020  1:00 PM Sandy Abel PT [facility]   8/18/2020  3:15 PM Sandy Abel PT [facility]   8/25/2020  1:45 PM Sandy Abel, PT [facility]   11/10/2020  9:30 AM MAT LAB [facility]   11/17/2020 10:00 AM ESTUARDO Cisneros [facility]   1/20/2021 11:15 AM MD FRANCISCA Lechuga [facility]

## 2020-08-13 ENCOUNTER — HOSPITAL ENCOUNTER (OUTPATIENT)
Dept: PHYSICAL THERAPY | Age: 75
Discharge: HOME | End: 2020-08-13
Attending: PHYSICIAN ASSISTANT
Payer: MEDICARE

## 2020-08-13 PROCEDURE — 97110 THERAPEUTIC EXERCISES: CPT

## 2020-08-13 NOTE — PROGRESS NOTES
Esequiel Hart  : 1945  [place]  Secondary: [address] at [address], [address] [place] [address],[address], [place] [address].  [phone]   [phone]         OUTPATIENT PHYSICAL THERAPY: Daily Treatment Note 2020  Visit Count:  17    ICD-10: Treatment Diagnosis: Low back pain (M54.5); Spinal stenosis, lumbar region (M48.06)  Precautions/Allergies:   Latex and Adhesive; **Pt IS ALLERGIC TO LATEX  TREATMENT PLAN:  Effective Dates: 2020 TO 2020 (60 days). Frequency/Duration: 2 times a week for 60 Day(s)    Pre-treatment Symptoms/Complaints:  Low Back and Bilateral LE Pain; Pt states he had some pain in his low back earlier, but not bad. Pt states he squatted in his garage the other day and had some difficulties getting up due to leg weakness. Pain: Initial:   0/10 stiffness Post Session:  0/10   Medications Last Reviewed:  2020  Updated Objective Findings:  None Today  TREATMENT:     THERAPEUTIC EXERCISE: (40 minutes):  Exercises per grid below to improve mobility and strength. Required minimal verbal cues to promote proper body alignment and promote proper body posture. Progressed resistance and repetitions as indicated. MODALITIES: (0 minutes): Moist heat to low back in sitting x10 minutes to decrease pain/soreness. Skin clear afterwards.    Date:  20 Date:  20 Date:  20 Date:  20   Activity/Exerciseg       [facility]       Piriformis Stretch       Pelvic Tilts       NuStep Level 5  8 minutes Level 5  8 minutes Level 5  8 minutes Level 5  8 minutes   Standing Heel Raises, Marching, Hip Abduction, Hip Extension and Knee Flexion 2 Lbs  20 reps each  B LE's 2 Lbs  20 reps each  B LE's 2.5 Lbs   20 reps each  B LE's 2.5 Lbs  20 reps each  B LE's   Supine Marching       Hip Adduction with Ball       T-band Hip Abduction       Alternating Toe Taps on Step 6 inch step  10 reps  B LE's one hand on rail 6 inch step  10 reps  B LE's  6 inch step  10 reps  B LE's 6 inch step  10 reps  B LE's   Step-Ups 6 inch step  10 reps  B LE's 6 inch step  10 reps  B LE's 6 inch step  10 reps  B LE's 6 inch step  10 reps  B LE's   Sit to Stands  No UE's  15 reps No UE's  15 reps  No UE's  15 reps   Bridging 15 reps   15 reps   SLR Flexion with TA       Stepping over 1/2 Foam Roll 10 reps each LE 10 reps each  LE 10 reps each LE 10 reps each  LE   T-band Rows Green T-band  20 reps Green T-band  20 reps  Green T-band  20 reps   T-band Straight Arm Pulldowns Green T-band  20 reps Green T-band  20 reps  Green T-band  20 reps   Standing on Blue Foam           MedBridge Portal  Treatment/Session Summary:    · Response to Treatment:  Pt tolerated all treatments well today with no c/o. Strength and balance improving. Pt still having some LE weakness, but slowly improving. · Communication/Consultation:  None today  · Equipment provided today:  None today  · Recommendations/Intent for next treatment session: Next visit will focus on progression of stretching/strengthening exercises as tolerable.     Total Treatment Billable Duration:  40 minutes  PT Patient Time In/Time Out  Time In: 1300  Time Out: Lb Britt PT    Future Appointments   Date Time Provider Christina Alston   8/18/2020  3:15 PM Gus Kelly, PT [facility]   8/25/2020  1:45 PM Gus Kelly PT [facility]   11/10/2020  9:30 AM MAT LAB [facility] MAT [facility]   11/17/2020 10:00 AM ESTUARDO Boo [facility]   1/20/2021 11:15 AM Sy Nelson MD [facility]

## 2020-08-18 ENCOUNTER — HOSPITAL ENCOUNTER (OUTPATIENT)
Dept: PHYSICAL THERAPY | Age: 75
Discharge: HOME | End: 2020-08-18
Attending: PHYSICIAN ASSISTANT
Payer: MEDICARE

## 2020-08-18 PROCEDURE — 97110 THERAPEUTIC EXERCISES: CPT

## 2020-08-18 NOTE — PROGRESS NOTES
Preet Tovar  : 1945  [place]  Secondary: [address] at [facility]  [place] [address], [address] [facility], [facility] [address]  [phone]   [phone]         OUTPATIENT PHYSICAL THERAPY: Daily Treatment Note 2020  Visit Count:  18    ICD-10: Treatment Diagnosis: Low back pain (M54.5); Spinal stenosis, lumbar region (M48.06)  Precautions/Allergies:   Latex and Adhesive; **Pt IS ALLERGIC TO LATEX  TREATMENT PLAN:  Effective Dates: 2020 TO 2020 (60 days). Frequency/Duration: 2 times a week for 60 Day(s)    Pre-treatment Symptoms/Complaints:  Low Back and Bilateral LE Pain; Pt states he is feeling stronger in his legs, has improved balance and less back pain. Pain: Initial:   0/10 Post Session:  0/10   Medications Last Reviewed:  2020  Updated Objective Findings:  None Today  TREATMENT:     THERAPEUTIC EXERCISE: (40 minutes):  Exercises per grid below to improve mobility and strength. Required minimal verbal cues to promote proper body alignment and promote proper body posture. Progressed resistance and repetitions as indicated. MODALITIES: (0 minutes): Moist heat to low back in sitting x10 minutes to decrease pain/soreness. Skin clear afterwards.    Date:  20 Date:  20 Date:  20 Date:  20 Date:  20   Activity/Exerciseg        [facility]        Piriformis Stretch        Pelvic Tilts        NuStep Level 5  8 minutes Level 5  8 minutes Level 5  8 minutes Level 5  8 minutes Level 5  8 minutes   Standing Heel Raises, Marching, Hip Abduction, Hip Extension and Knee Flexion 2 Lbs  20 reps each  B LE's 2 Lbs  20 reps each  B LE's 2.5 Lbs   20 reps each  B LE's 2.5 Lbs  20 reps each  B LE's 3 Lbs  20 reps each  B LE's   Supine Marching        Hip Adduction with Ball        T-band Hip Abduction        Alternating Toe Taps on Step 6 inch step  10 reps  B LE's one hand on rail 6 inch step  10 reps  B LE's  6 inch step  10 reps  B LE's 6 inch step  10 reps  B LE's 6 inch step  10 reps  B LE's  No UE Use   Step-Ups 6 inch step  10 reps  B LE's 6 inch step  10 reps  B LE's 6 inch step  10 reps  B LE's 6 inch step  10 reps  B LE's 6 inch step  10 reps  B LE's  No UE Use   Sit to Stands  No UE's  15 reps No UE's  15 reps  No UE's  15 reps No UE's  15 reps   Bridging 15 reps   15 reps 20 reps   SLR Flexion with TA        Stepping over 1/2 Foam Roll 10 reps each LE 10 reps each  LE 10 reps each LE 10 reps each  LE 10 reps each LE   T-band Rows Green T-band  20 reps Green T-band  20 reps  Green T-band  20 reps Blue T-band  20 reps   T-band Straight Arm Pulldowns Green T-band  20 reps Green T-band  20 reps  Green T-band  20 reps Blue T-band  20 reps   Standing on Blue Foam            MedBridge Portal  Treatment/Session Summary:    · Response to Treatment:  Pt tolerated all treatments well today with no c/o. Increased resstance and repetitions on several exercises today and pt tolerated well. Will plan to continue x1 more visit, then possible discharge if pt still doing well. · Communication/Consultation:  None today  · Equipment provided today:  None today  · Recommendations/Intent for next treatment session: Next visit will focus on progression of stretching/strengthening exercises as tolerable.     Total Treatment Billable Duration:  40 minutes  PT Patient Time In/Time Out  Time In: 7121  Time Out: 1600  Melissa Machado PT    Future Appointments   Date Time Provider Christina Alston   8/25/2020  1:45 PM Sandy Abel [facility]   11/10/2020  9:30 AM MAT LAB [facility]   11/17/2020 10:00 AM ESTUARDO Cisneros [facility]   1/20/2021 11:15 AM Veronika Luna [facility]

## 2020-08-25 ENCOUNTER — HOSPITAL ENCOUNTER (OUTPATIENT)
Dept: PHYSICAL THERAPY | Age: 75
Discharge: HOME | End: 2020-08-25
Attending: PHYSICIAN ASSISTANT
Payer: MEDICARE

## 2020-08-25 PROCEDURE — 97110 THERAPEUTIC EXERCISES: CPT

## 2020-08-25 NOTE — PROGRESS NOTES
Patient's son calls stating that over past two weeks patient has been becoming increasingly weaker. He has a difficult time getting up to standing and even more difficult time walking. Patient has been using a walked which he has never needed in the past. Patient denies any dizziness and states that bps have been 130's/70's. Denies any recent illness or injury or any other changes. Dr. FARHEEN Mcintosh out of the office today. Patient and son are aware. Please advise.   Ravin Goel  : 1945  [place]  Secondary: [address] at [facility]  [address] [facility], [address] [place] [address],[address], [address] [facility]  [phone]   [phone]         OUTPATIENT PHYSICAL THERAPY: Daily Treatment Note 2020  Visit Count:  19    ICD-10: Treatment Diagnosis: Low back pain (M54.5); Spinal stenosis, lumbar region (M48.06)  Precautions/Allergies:   Latex and Adhesive; **Pt IS ALLERGIC TO LATEX  TREATMENT PLAN:  Effective Dates: 2020 TO 2020 (60 days). Frequency/Duration: 2 times a week for 60 Day(s)    Pre-treatment Symptoms/Complaints:  Low Back and Bilateral LE Pain; Pt states he is doing much better. Pain: Initial:   0/10 Post Session:  0/10   Medications Last Reviewed:  2020  Updated Objective Findings:  None Today  TREATMENT:     THERAPEUTIC EXERCISE: (40 minutes):  Exercises per grid below to improve mobility and strength. Required minimal verbal cues to promote proper body alignment and promote proper body posture. Progressed resistance and repetitions as indicated. MODALITIES: (0 minutes): Moist heat to low back in sitting x10 minutes to decrease pain/soreness. Skin clear afterwards.    Date:  20 Date:  20 Date:  20 Date:  20 Date:  20   Activity/Exerciseg        [facility]        Piriformis Stretch        Pelvic Tilts        NuStep Level 5  8 minutes Level 5  8 minutes Level 5  8 minutes Level 5  8 minutes Level 5  8 minutes   Standing Heel Raises, Marching, Hip Abduction, Hip Extension and Knee Flexion 2 Lbs  20 reps each  B LE's 2.5 Lbs   20 reps each  B LE's 2.5 Lbs  20 reps each  B LE's 3 Lbs  20 reps each  B LE's 3 Lbs  20 reps each  B LE's   Supine Marching        Hip Adduction with Ball        T-band Hip Abduction        Alternating Toe Taps on Step 6 inch step  10 reps  B LE's  6 inch step  10 reps  B LE's 6 inch step  10 reps  B LE's 6 inch step  10 reps  B LE's  No UE Use 6 inch step  10 reps  B LE's  No UE Use   Step-Ups 6 inch step  10 reps  B LE's 6 inch step  10 reps  B LE's 6 inch step  10 reps  B LE's 6 inch step  10 reps  B LE's  No UE Use 6 inch step  10 reps  B LE's  No UE USe   Sit to Stands  No UE's  15 reps  No UE's  15 reps No UE's  15 reps No UE's  15 reps   Bridging   15 reps 20 reps 20 reps   SLR Flexion with TA        Stepping over 1/2 Foam Roll 10 reps each  LE 10 reps each LE 10 reps each  LE 10 reps each LE 10 reps each LE   T-band Rows Green T-band  20 reps  Green T-band  20 reps Blue T-band  20 reps Blue T-band  20 reps   T-band Straight Arm Pulldowns Green T-band  20 reps  Green T-band  20 reps Blue T-band  20 reps Blue T-band  20 reps   Standing on Blue Foam            [facility] Portal  Treatment/Session Summary:    · Response to Treatment:  Pt tolerated all treatments well today with no c/o. Discharge from PT to independent [facility]. · Communication/Consultation:  None today  · Equipment provided today:  None today  · Recommendations/Intent for next treatment session: Next visit will focus on progression of stretching/strengthening exercises as tolerable.     Total Treatment Billable Duration:  40 minutes  PT Patient Time In/Time Out  Time In: 0392  Time Out: [address] [facility], PT    Future Appointments   Date Time Provider Christina Alston   11/10/2020  9:30 AM MAT [facility]   11/17/2020 10:00 AM ESTUARDO Recio [facility]   1/20/2021 11:15 AM Annalisa Luna [facility]

## 2020-08-25 NOTE — THERAPY DISCHARGE
Preet Tovar  : 1945  [place]  Secondary: [address] at [facility]  [address] [facility] [address], [address], [address] [facility]  [phone]   [phone]           OUTPATIENT PHYSICAL THERAPY:Discharge Summary 2020   ICD-10: Treatment Diagnosis: Low back pain (M54.5); Spinal stenosis, lumbar region (M48.06)  Precautions/Allergies:   Latex and Adhesive; **Pt IS ALLERGIC TO LATEX  TREATMENT PLAN:  Effective Dates: 2020 TO 2020 (60 days). Frequency/Duration: 2 times a week for 60 Day(s) MEDICAL/REFERRING DIAGNOSIS:  Spinal stenosis, lumbar region with neurogenic claudication [M48.062]   DATE OF ONSET: Chronic LBP  REFERRING PHYSICIAN: Vidya Munoz PA-C MD Orders: Evaluate and Treat  Return MD Appointment: Pt has no follow up appt at this time     INITIAL ASSESSMENT:  Mr. Nahum Ravi attended 19 visits of physical therapy from 20 to 20 with increased lumbar ROM, LE strength and balance. Discharge from PT to independent HEP. PROBLEM LIST (Impacting functional limitations):  1. Decreased Strength  2. Decreased ADL/Functional Activities  3. Increased Pain  4. Decreased Flexibility/Joint Mobility  5. Decreased [place] with Home Exercise Program INTERVENTIONS PLANNED: (Treatment may consist of any combination of the following)  1. Balance Exercise  2. Cold  3. Cryotherapy  4. Electrical Stimulation  5. Heat  6. Home Exercise Program (HEP)  7. Manual Therapy  8. Range of Motion (ROM)  9. Therapeutic Exercise/Strengthening  10. Ultrasound (US)  11. Aquatic Therapy     GOALS: (Goals have been discussed and agreed upon with patient.)  Short-Term Functional Goals: Time Frame: 4 weeks  1. Pt will increase ROM lumbar flexion = 50 degrees, side bending = 30 degrees bilaterally to assist with household ADL's (Goal Met)  2.  Pt will increase strength bilateral LE's 4+/5 to assist with household ADL's (Goal Met)  3. Pt will be independent with HEP (Goal Met)  Discharge Goals: Time Frame: 12 weeks  1. Pt will increase lumbar ROM flexion = 60 degrees, side bending = 30 degrees bilaterally to assist with household ADL's  2. Pt will increase strength bilateral LE's 5/5 to assist with household ADL's  3. Pt will perform 20 minutes household cleaning activities independently with min to no c/o LBP    OUTCOME MEASURE:   Tool Used: Modified Oswestry Low Back Pain Questionnaire  Score:  Initial: 30/50  Most Recent: 9/50 (Date:07-09-20 )   Interpretation of Score: Each section is scored on a 0-5 scale, 5 representing the greatest disability. The scores of each section are added together for a total score of 50. MEDICAL NECESSITY:   · Patient is expected to demonstrate progress in strength, range of motion and functional technique to increase independence with household ADL's. · Patient demonstrates good rehab potential due to higher previous functional level. REASON FOR SERVICES/OTHER COMMENTS:  · Patient continues to require skilled intervention due to decreased lumbar ROM, decreased LE strength/flexibility and increased pain leading to decreased functional status. Total Duration:  PT Patient Time In/Time Out  Time In: 1345  Time Out: 1430    Rehabilitation Potential For Stated Goals: Good  Regarding Jonna Morris's therapy, I certify that the treatment plan above will be carried out by a therapist or under their direction. Thank you for this referral,  Elijah Ogden, PT     Referring Physician Signature: Moreno Boyer PA-C _______________________________ Date _____________     PAIN/SUBJECTIVE:   Initial:   0/10 sitting at rest increasing to 3/10 at worst Post Session:  0/10 sitting at rest   HISTORY:   History of Injury/Illness (Reason for Referral):  Pt reports insidious onset low back pain of approximately 6 months duration.   He had a lumbar MRI which revealed a lot of arthritis in his spine/spinal stenosis. He currently c/o pain in his bilateral low back R>L. Pt c/o pain in his bilateral posterior thighs and lower legs. Aggravating factors include prolonged walking and prolonged sitting/driving. He has difficulties with grocery shopping and performing all household cleaning activities due to his back and LE pain. Pt rates his current pain 5/10 sitting at rest, increasing to 10/10 at worst.  He states his legs feel weak. 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 He states he has had a few falls recently due to his LE weakness. Pt is taking Tylenol and using a \"pain relief cream\" prn for his back pain. Pt also c/o some numbness in the toes of his bilateral feet. Past Medical History/Comorbidities:   Mr. Daryl Kumar  has a past medical history of AAA (abdominal aortic aneurysm) ([facility] (01/20/2020), CAD (coronary artery disease), Chronic obstructive pulmonary disease ([facility] [address] Depression, Generalized anxiety disorder (11/25/2014), History of basal cell cancer, Hyperlipidemia LDL goal < 70, Hypertension, Insomnia secondary to depression with anxiety (11/25/2014), Lumbar herniated disc (10/17/13), Lumbar spinal stenosis, Nocturnal hypoxemia (12/05/2017), Osteoporosis, Perennial allergic rhinitis with seasonal variation (11/25/2014), Squamous cell carcinoma (12/2018), Subarachnoid hematoma ([facility] (07/08/2014), T12 vertebral fracture ([facility] (1/25/15), Tobacco use disorder, and Vitamin D deficiency (11/25/2014). Mr. Daryl Kumar  has a past surgical history that includes hx tonsillectomy; hx heart catheterization (4/2005); hx appendectomy; hx hernia repair (Right); hx kyphoplasty (04/2015); hx coronary stent placement (04/2005); hx cataract removal (Bilateral, 03/28/2017 & 04/25/2017); hx cyst removal (1965); and hx mohs procedure (Left, Dec 2018 & June 2019). Social History/Living Environment:     Pt lives with wife in a 3-story house.   He reports difficulties ascending/descending stairs due to his low back and LE pain  Prior Level of Function/Work/Activity:  Pt is retired  Dominant Side:         LEFT  Other Clinical Tests:          MRI lumbar spine revealed arthritis/spinal stenosis per patient  Personal Factors:          Sex:  male        Age:  75 y.o. Profession:  Pt is retired   Ambulatory/Rehab [address] [place] [address] Risk Assessment   Risk Factors:       (1)  Gender [Male]       (5)  History of Recent Falls [w/in 3 months] Ability to Rise from Chair:       (1)  Pushes up, successful in one attempt   Falls Prevention Plan:       Exercise/Equipment Adaptation (specify):  Close SBA to CGA during standing exercise   Total: (5 or greater = High Risk): 7   ©2010 [facility] [place] . [facility] Patent #[id number]. Federal Law prohibits the replication, distribution or use without written permission from [facility] [employer]   Current Medications:       Current Outpatient Medications:     traMADoL (ULTRAM) 50 mg tablet, Take 1 Tab by mouth two (2) times a day for 30 days. Max Daily Amount: 100 mg., Disp: 60 Tab, Rfl: 2    OTHER, Prevagen, Disp: , Rfl:     rosuvastatin (CRESTOR) 20 mg tablet, Take 1 Tab by mouth nightly. Indications: high cholesterol, Disp: 90 Tab, Rfl: 1    carvediloL (COREG) 6.25 mg tablet, Take 1 Tab by mouth two (2) times daily (with meals). Indications: high blood pressure, Disp: 180 Tab, Rfl: 3    escitalopram oxalate (Lexapro) 20 mg tablet, Take 1 Tab by mouth daily. Indications: anxiousness associated with depression, Disp: 90 Tab, Rfl: 3    QUEtiapine (SEROquel) 100 mg tablet, Take 1 Tab by mouth nightly., Disp: 90 Tab, Rfl: 1    fluticasone propionate (FLONASE) 50 mcg/actuation nasal spray, Squirt 2 sprays/nostril once daily as directed (Patient taking differently: 2 Sprays by Both Nostrils route daily.  Squirt 2 sprays/nostril once daily as directed), Disp: 3 Bottle, Rfl: 3    albuterol-ipratropium (DUO-NEB) 2.5 mg-0.5 mg/3 ml nebu, 3 mL by Nebulization route every six (6) hours as needed (wheezing). , Disp: 360 Nebule, Rfl: 3    albuterol (Ventolin HFA) 90 mcg/actuation inhaler, Take 2 Puffs by inhalation every six (6) hours as needed for Wheezing or Shortness of Breath., Disp: 1 Inhaler, Rfl: 5    montelukast (SINGULAIR) 10 mg tablet, Take 1 Tab by mouth daily. , Disp: 90 Tab, Rfl: 3    fluticasone-umeclidinium-vilanterol (TRELEGY ELLIPTA) 100-62.5-25 mcg inhaler, Take 1 Puff by inhalation daily. , Disp: 3 Inhaler, Rfl: 3    clonazePAM (KLONOPIN) 1 mg tablet, Take 1 mg by mouth two (2) times a day., Disp: , Rfl:     OTHER, Indications: perrigo shampo, Disp: , Rfl:     acetaminophen (TYLENOL) 650 mg TbER, Take 650 mg by mouth every eight (8) hours. , Disp: , Rfl:     nitroglycerin (NITROSTAT) 0.4 mg SL tablet, 1 Tab by SubLINGual route every five (5) minutes as needed. , Disp: 1 Bottle, Rfl: 3    glucosam/sandy-msm1/C/olga/bosw (OSTEO BI-FLEX TRIPLE STRENGTH PO), Take  by mouth daily. , Disp: , Rfl:     peg 400-propylene glycol (SYSTANE, PROPYLENE GLYCOL,) 0.4-0.3 % drop, as needed. , Disp: , Rfl:     ketoconazole (NIZORAL) 2 % topical cream, Apply  to affected area daily. , Disp: , Rfl:     vit C/E/Zn/coppr/lutein/zeaxan (PRESERVISION AREDS-2 PO), Take  by mouth., Disp: , Rfl:     Nebulizer & Compressor (PORTABLE NEBULIZER SYSTEM) machine, Use as directed. Dx: Mucopurulent Chronic Bronchitis - J44.9, Disp: 1 Each, Rfl: 0    mometasone (ELOCON) 0.1 % ointment, Apply  to affected area daily. , Disp: , Rfl:     guaiFENesin (MUCINEX) 1,200 mg Ta12 ER tablet, Take 1,200 mg by mouth two (2) times a day., Disp: , Rfl:     cyanocobalamin (VITAMIN B12) 500 mcg tablet, Take 500 mcg by mouth daily. , Disp: , Rfl:     coenzyme q10-vitamin e 100-100 mg-unit cap, Take  by mouth daily. , Disp: , Rfl:     sennosides 8.6 mg cap, Take 8.6 mg by mouth daily as needed. , Disp: , Rfl:     calcium citrate-vitamin D3 (CITRACAL + D) tablet, Take 1 Tab by mouth daily. , Disp: , Rfl:     eye lubricant combination no.1 2-0.9-1.8 % drop, Apply 2 Drops to eye daily. Indications: DRY EYE, Disp: , Rfl:     OMEGA-3 FATTY ACIDS (FISH OIL CONCENTRATE PO), Take 2 Cans by mouth daily (after lunch). Indications: hold until after surgery, Disp: , Rfl:     Aspirin, Buffered 81 mg tab, Take 81 mg by mouth every morning. Indications: myocardial reinfarction prevention, PTCA, Disp: , Rfl:     cholecalciferol (VITAMIN D3) 1,000 unit tablet, Take 5,000 Units by mouth daily (after lunch). Indications: PREVENTION OF VITAMIN D DEFICIENCY, Disp: , Rfl:     loratadine (CLARITIN) 10 mg tablet, Take 10 mg by mouth as needed. Indications: ALLERGIC RHINITIS, Disp: , Rfl:    Date Last Reviewed:  03-03-20   Number of Personal Factors/Comorbidities that affect the Plan of Care: 1-2: MODERATE COMPLEXITY   EXAMINATION:   Observation/Orthostatic Postural Assessment:           Forward head, rounded shoulders  Palpation:          No tenderness noted to palpation in low back or LE's  ROM:    Lumbar Flexion = 50 degrees (pulling in bilateral low back)  Lumbar Extension = 0 degrees  Lumbar Side Bending R = 30 degrees  Lumbar Side Bending L = 30 degrees    Strength:    R hip flexion = 4/+5  R hip abduction = 4+/5  R hip adduction = 4+/5  R knee extension = 4+/5  R knee flexion = 4+/5  R ankle dorsiflexion = 4+/5  R ankle plantarflexion = 5/5    L hip flexion = 4+/5  L hip abduction = 4+/5  L hip adduction = 5/5  L knee extension = 4+/5  L knee flexion = 4+/5  L ankle dorsiflexion = 4+/5  L ankle plantarflexion = 5/5    Special Tests:          SLR 40 degrees with marked hamstring tightness noted bilaterally   Neurological Screen:        Myotomes:  Weakness bilateral LE's R>L        Dermatomes:  Sensation intact to light touch bilateral LE's        Reflexes:  DTR's 1+ to bilateral patellar and achilles  Functional Mobility:         Gait/Ambulation:  Pt walks with mild antalgic gait         Transfers:  Pt able to transition sit to supine and supine to sit modified independent    Body Structures Involved:  1. Nerves  2. Bones  3. Joints  4. Muscles Body Functions Affected:  1. Sensory/Pain  2. Neuromusculoskeletal Activities and Participation Affected:  1. Mobility  2.  Domestic Life   Number of elements (examined above) that affect the Plan of Care: 3: MODERATE COMPLEXITY   CLINICAL PRESENTATION:   Presentation: Evolving clinical presentation with changing clinical characteristics: MODERATE COMPLEXITY   CLINICAL DECISION MAKING:   Use of outcome tool(s) and clinical judgement create a POC that gives a: Questionable prediction of patient's progress: MODERATE COMPLEXITY

## 2020-08-31 ENCOUNTER — APPOINTMENT (RX ONLY)
Dept: URBAN - METROPOLITAN AREA CLINIC 23 | Facility: CLINIC | Age: 75
Setting detail: DERMATOLOGY
End: 2020-08-31

## 2020-08-31 DIAGNOSIS — L21.8 OTHER SEBORRHEIC DERMATITIS: ICD-10-CM

## 2020-08-31 DIAGNOSIS — L82.1 OTHER SEBORRHEIC KERATOSIS: ICD-10-CM

## 2020-08-31 DIAGNOSIS — D18.0 HEMANGIOMA: ICD-10-CM

## 2020-08-31 DIAGNOSIS — Z85.828 PERSONAL HISTORY OF OTHER MALIGNANT NEOPLASM OF SKIN: ICD-10-CM

## 2020-08-31 DIAGNOSIS — L72.8 OTHER FOLLICULAR CYSTS OF THE SKIN AND SUBCUTANEOUS TISSUE: ICD-10-CM

## 2020-08-31 DIAGNOSIS — L81.4 OTHER MELANIN HYPERPIGMENTATION: ICD-10-CM

## 2020-08-31 PROBLEM — J44.9 CHRONIC OBSTRUCTIVE PULMONARY DISEASE, UNSPECIFIED: Status: ACTIVE | Noted: 2020-08-31

## 2020-08-31 PROBLEM — I10 ESSENTIAL (PRIMARY) HYPERTENSION: Status: ACTIVE | Noted: 2020-08-31

## 2020-08-31 PROBLEM — J30.1 ALLERGIC RHINITIS DUE TO POLLEN: Status: ACTIVE | Noted: 2020-08-31

## 2020-08-31 PROBLEM — D18.01 HEMANGIOMA OF SKIN AND SUBCUTANEOUS TISSUE: Status: ACTIVE | Noted: 2020-08-31

## 2020-08-31 PROCEDURE — 99213 OFFICE O/P EST LOW 20 MIN: CPT

## 2020-08-31 PROCEDURE — ? PRESCRIPTION

## 2020-08-31 PROCEDURE — ? COUNSELING

## 2020-08-31 PROCEDURE — ? OTHER

## 2020-08-31 RX ORDER — HYDROCORTISONE 25 MG/G
CREAM TOPICAL
Qty: 1 | Refills: 2 | Status: ERX | COMMUNITY
Start: 2020-08-31

## 2020-08-31 RX ADMIN — HYDROCORTISONE: 25 CREAM TOPICAL at 00:00

## 2020-08-31 ASSESSMENT — LOCATION SIMPLE DESCRIPTION DERM
LOCATION SIMPLE: ABDOMEN
LOCATION SIMPLE: LEFT EYEBROW
LOCATION SIMPLE: LEFT UPPER BACK
LOCATION SIMPLE: RIGHT UPPER BACK
LOCATION SIMPLE: NOSE
LOCATION SIMPLE: RIGHT SHOULDER
LOCATION SIMPLE: LEFT FOREARM
LOCATION SIMPLE: LEFT CHEEK
LOCATION SIMPLE: RIGHT EYEBROW
LOCATION SIMPLE: CHEST

## 2020-08-31 ASSESSMENT — LOCATION DETAILED DESCRIPTION DERM
LOCATION DETAILED: EPIGASTRIC SKIN
LOCATION DETAILED: NASAL DORSUM
LOCATION DETAILED: LEFT VENTRAL LATERAL PROXIMAL FOREARM
LOCATION DETAILED: LEFT SUPERIOR LATERAL BUCCAL CHEEK
LOCATION DETAILED: RIGHT MID-UPPER BACK
LOCATION DETAILED: LEFT SUPERIOR LATERAL UPPER BACK
LOCATION DETAILED: LEFT CENTRAL EYEBROW
LOCATION DETAILED: LEFT LATERAL SUPERIOR CHEST
LOCATION DETAILED: RIGHT POSTERIOR SHOULDER
LOCATION DETAILED: LEFT MID-UPPER BACK
LOCATION DETAILED: RIGHT CENTRAL EYEBROW

## 2020-08-31 ASSESSMENT — LOCATION ZONE DERM
LOCATION ZONE: ARM
LOCATION ZONE: NOSE
LOCATION ZONE: TRUNK
LOCATION ZONE: FACE

## 2020-08-31 NOTE — PROCEDURE: OTHER
Other (Free Text): Pt has several [facility] treated in the past ( [place])
Detail Level: Zone
Note Text (......Xxx Chief Complaint.): This diagnosis correlates with the

## 2021-09-07 ENCOUNTER — RX ONLY (OUTPATIENT)
Age: 76
Setting detail: RX ONLY
End: 2021-09-07

## 2021-09-07 RX ORDER — KETOCONAZOLE 20 MG/ML
SHAMPOO, SUSPENSION TOPICAL
Qty: 1 | Refills: 11 | Status: ERX | COMMUNITY
Start: 2021-09-07

## 2022-03-04 PROBLEM — I71.40 AAA (ABDOMINAL AORTIC ANEURYSM) WITHOUT RUPTURE: Status: ACTIVE | Noted: 2022-03-04

## 2022-03-18 PROBLEM — I71.40 AAA (ABDOMINAL AORTIC ANEURYSM) WITHOUT RUPTURE: Status: ACTIVE | Noted: 2022-03-04

## 2022-03-19 PROBLEM — J41.1 MUCOPURULENT CHRONIC BRONCHITIS (HCC): Status: ACTIVE | Noted: 2018-01-29

## 2022-04-26 PROBLEM — F11.99 OPIOID USE, UNSPECIFIED WITH UNSPECIFIED OPIOID-INDUCED DISORDER (HCC): Status: ACTIVE | Noted: 2022-04-26

## 2022-07-22 DIAGNOSIS — E55.9 VITAMIN D DEFICIENCY: ICD-10-CM

## 2022-07-22 DIAGNOSIS — E78.5 HYPERLIPIDEMIA WITH TARGET LDL LESS THAN 70: Primary | ICD-10-CM

## 2022-07-22 DIAGNOSIS — I25.10 ATHEROSCLEROSIS OF NATIVE CORONARY ARTERY OF NATIVE HEART WITHOUT ANGINA PECTORIS: ICD-10-CM

## 2022-07-26 ENCOUNTER — TELEPHONE (OUTPATIENT)
Dept: INTERNAL MEDICINE CLINIC | Facility: CLINIC | Age: 77
End: 2022-07-26

## 2022-07-26 ENCOUNTER — NURSE ONLY (OUTPATIENT)
Dept: INTERNAL MEDICINE CLINIC | Facility: CLINIC | Age: 77
End: 2022-07-26

## 2022-07-26 DIAGNOSIS — I25.10 ATHEROSCLEROSIS OF NATIVE CORONARY ARTERY OF NATIVE HEART WITHOUT ANGINA PECTORIS: ICD-10-CM

## 2022-07-26 DIAGNOSIS — E55.9 VITAMIN D DEFICIENCY: ICD-10-CM

## 2022-07-26 DIAGNOSIS — E78.5 HYPERLIPIDEMIA WITH TARGET LDL LESS THAN 70: ICD-10-CM

## 2022-07-26 LAB
BASOPHILS # BLD: 0.1 K/UL (ref 0–0.2)
BASOPHILS NFR BLD: 1 % (ref 0–2)
DIFFERENTIAL METHOD BLD: ABNORMAL
EOSINOPHIL # BLD: 0.2 K/UL (ref 0–0.8)
EOSINOPHIL NFR BLD: 1 % (ref 0.5–7.8)
ERYTHROCYTE [DISTWIDTH] IN BLOOD BY AUTOMATED COUNT: 15.5 % (ref 11.9–14.6)
HCT VFR BLD AUTO: 49.3 % (ref 41.1–50.3)
HGB BLD-MCNC: 14.9 G/DL (ref 13.6–17.2)
IMM GRANULOCYTES # BLD AUTO: 0.1 K/UL (ref 0–0.5)
IMM GRANULOCYTES NFR BLD AUTO: 1 % (ref 0–5)
LYMPHOCYTES # BLD: 3.1 K/UL (ref 0.5–4.6)
LYMPHOCYTES NFR BLD: 23 % (ref 13–44)
MCH RBC QN AUTO: 30.8 PG (ref 26.1–32.9)
MCHC RBC AUTO-ENTMCNC: 30.2 G/DL (ref 31.4–35)
MCV RBC AUTO: 102.1 FL (ref 79.6–97.8)
MONOCYTES # BLD: 1.4 K/UL (ref 0.1–1.3)
MONOCYTES NFR BLD: 10 % (ref 4–12)
NEUTS SEG # BLD: 8.7 K/UL (ref 1.7–8.2)
NEUTS SEG NFR BLD: 65 % (ref 43–78)
NRBC # BLD: 0 K/UL (ref 0–0.2)
PLATELET # BLD AUTO: 257 K/UL (ref 150–450)
PMV BLD AUTO: 10.9 FL (ref 9.4–12.3)
RBC # BLD AUTO: 4.83 M/UL (ref 4.23–5.6)
WBC # BLD AUTO: 13.4 K/UL (ref 4.3–11.1)

## 2022-07-26 NOTE — TELEPHONE ENCOUNTER
Patient was offered an earlier appointment with Tristen Guy. He stated he would just keep the appointment he has with Mary Chavez.

## 2022-07-26 NOTE — TELEPHONE ENCOUNTER
----- Message from Yadi Prather sent at 7/26/2022 11:36 AM EDT -----  Subject: Message to Provider    QUESTIONS  Information for Provider? Pt would like a closer appt than august 29  ---------------------------------------------------------------------------  --------------  [place] INFO  [phone]; OK to leave message on voicemail  ---------------------------------------------------------------------------  --------------  SCRIPT ANSWERS  Relationship to Patient?  Self

## 2022-07-27 LAB
25(OH)D3 SERPL-MCNC: 29.3 NG/ML (ref 30–100)
ALBUMIN SERPL-MCNC: 3.9 G/DL (ref 3.2–4.6)
ALBUMIN/GLOB SERPL: 1.2 {RATIO} (ref 1.2–3.5)
ALP SERPL-CCNC: 76 U/L (ref 50–136)
ALT SERPL-CCNC: 34 U/L (ref 12–65)
ANION GAP SERPL CALC-SCNC: 6 MMOL/L (ref 7–16)
AST SERPL-CCNC: 26 U/L (ref 15–37)
BILIRUB SERPL-MCNC: 0.6 MG/DL (ref 0.2–1.1)
BUN SERPL-MCNC: 26 MG/DL (ref 8–23)
CALCIUM SERPL-MCNC: 9.7 MG/DL (ref 8.3–10.4)
CHLORIDE SERPL-SCNC: 103 MMOL/L (ref 98–107)
CHOLEST SERPL-MCNC: 120 MG/DL
CO2 SERPL-SCNC: 29 MMOL/L (ref 21–32)
CREAT SERPL-MCNC: 0.9 MG/DL (ref 0.8–1.5)
GLOBULIN SER CALC-MCNC: 3.3 G/DL (ref 2.3–3.5)
GLUCOSE SERPL-MCNC: 78 MG/DL (ref 65–100)
HDLC SERPL-MCNC: 70 MG/DL (ref 40–60)
HDLC SERPL: 1.7 {RATIO}
LDLC SERPL CALC-MCNC: 32 MG/DL
POTASSIUM SERPL-SCNC: 3.9 MMOL/L (ref 3.5–5.1)
PROT SERPL-MCNC: 7.2 G/DL (ref 6.3–8.2)
SODIUM SERPL-SCNC: 138 MMOL/L (ref 136–145)
TRIGL SERPL-MCNC: 90 MG/DL (ref 35–150)
VLDLC SERPL CALC-MCNC: 18 MG/DL (ref 6–23)

## 2022-08-02 ENCOUNTER — TELEPHONE (OUTPATIENT)
Dept: INTERNAL MEDICINE CLINIC | Facility: CLINIC | Age: 77
End: 2022-08-02

## 2022-08-02 NOTE — TELEPHONE ENCOUNTER
----- Message from Pedro Luis Farooq sent at 8/2/2022  9:59 AM EDT -----  Subject: Message to Provider    QUESTIONS  Information for Provider? pt is trying to schedule AWV appointment and   already has an appointment on August 29th but doesn't know if that's an   AWV or not, pt asking if his appointment on August 29th can be his AWV,   leave detailed voicemail   ---------------------------------------------------------------------------  --------------  [address] [facility] [facility]  [phone]; OK to leave message on voicemail  ---------------------------------------------------------------------------  --------------  SCRIPT ANSWERS  Relationship to Patient?  Self

## 2022-08-02 NOTE — TELEPHONE ENCOUNTER
----- Message from saida Hayes sent at 8/2/2022  9:54 AM EDT -----  Subject: Appointment Request    Reason for Call: Established Patient Appointment needed: Routine Medicare   AWV    QUESTIONS    Reason for appointment request? No appointments available during search     Additional Information for Provider? pt is needing call back to schedule   AWV, leave detailed voicemail  ---------------------------------------------------------------------------  --------------  [address] [facility]  [phone]; OK to leave message on voicemail  ---------------------------------------------------------------------------  --------------  SCRIPT ANSWERS  COVID Screen: Debbie De La Cruz

## 2022-08-09 DIAGNOSIS — F11.99 OPIOID USE, UNSPECIFIED WITH UNSPECIFIED OPIOID-INDUCED DISORDER (HCC): ICD-10-CM

## 2022-08-09 DIAGNOSIS — M48.062 SPINAL STENOSIS OF LUMBAR REGION WITH NEUROGENIC CLAUDICATION: Primary | ICD-10-CM

## 2022-08-09 NOTE — TELEPHONE ENCOUNTER
----- Message from Wilmer Yao sent at 8/9/2022 10:39 AM EDT -----  Subject: Refill Request    QUESTIONS  Name of Medication? traMADol (ULTRAM) 50 MG tablet  Patient-reported dosage and instructions? 50mg tablet 2x a day  How many days do you have left? 1  Preferred Pharmacy? [address] [address]  Pharmacy phone number (if available)? [phone]  ---------------------------------------------------------------------------  --------------  Iftikhar Sims INFO  What is the best way for the office to contact you? OK to leave message on   voicemail  Preferred Call Back Phone Number? [phone]  ---------------------------------------------------------------------------  --------------  SCRIPT ANSWERS  Relationship to Patient?  Self

## 2022-08-09 NOTE — TELEPHONE ENCOUNTER
----- Message from Tanja Samuel sent at 8/9/2022 10:39 AM EDT -----  Subject: Refill Request    QUESTIONS  Name of Medication? traMADol (ULTRAM) 50 MG tablet  Patient-reported dosage and instructions? 50mg tablet 2x a day  How many days do you have left? 1  Preferred Pharmacy? [address] [address]  Pharmacy phone number (if available)? [phone]  ---------------------------------------------------------------------------  --------------  Daryle Haver INFO  What is the best way for the office to contact you? OK to leave message on   voicemail  Preferred Call Back Phone Number? [phone]  ---------------------------------------------------------------------------  --------------  SCRIPT ANSWERS  Relationship to Patient?  Self

## 2022-08-09 NOTE — TELEPHONE ENCOUNTER
Pt requesting Tramadol refill, please. Scripts last fill date: 7/9/22 for a 30 day supply. Pended for pick-up today.

## 2022-08-10 RX ORDER — TRAMADOL HYDROCHLORIDE 50 MG/1
50 TABLET ORAL 2 TIMES DAILY PRN
Qty: 60 TABLET | Refills: 0 | Status: SHIPPED | OUTPATIENT
Start: 2022-08-10 | End: 2022-09-09 | Stop reason: SDUPTHER

## 2022-09-08 DIAGNOSIS — F11.99 OPIOID USE, UNSPECIFIED WITH UNSPECIFIED OPIOID-INDUCED DISORDER (HCC): ICD-10-CM

## 2022-09-08 DIAGNOSIS — M48.062 SPINAL STENOSIS OF LUMBAR REGION WITH NEUROGENIC CLAUDICATION: ICD-10-CM

## 2022-09-08 NOTE — TELEPHONE ENCOUNTER
Yasmine Torres pt requesting Tramadol refill, please. Scripts last fill date: 8/10/22. Pended for pick-up on 9/9/22.

## 2022-09-09 RX ORDER — NALOXONE HYDROCHLORIDE 4 MG/.1ML
SPRAY NASAL
Qty: 2 EACH | Refills: 1 | Status: SHIPPED | OUTPATIENT
Start: 2022-09-09

## 2022-09-09 RX ORDER — TRAMADOL HYDROCHLORIDE 50 MG/1
50 TABLET ORAL 2 TIMES DAILY PRN
Qty: 6 TABLET | Refills: 0 | Status: SHIPPED | OUTPATIENT
Start: 2022-09-09 | End: 2022-09-12 | Stop reason: SDUPTHER

## 2022-09-09 NOTE — TELEPHONE ENCOUNTER
Patient requesting refill on tramadol. PDMP reviewed showing tramadol 50 mg tablets, quantity #60, 30-day supply last filled on 8/10/2022. Short-term prescription sent to pharmacy on record until regular prescriber returns to the office. Prescription for Narcan reversal agent also sent to pharmacy. Orders Placed This Encounter    traMADol (ULTRAM) 50 MG tablet     Sig: Take 1 tablet by mouth 2 times daily as needed for Pain for up to 3 days. Dispense:  6 tablet     Refill:  0     Reduce doses taken as pain becomes manageable    naloxone (NARCAN) 4 MG/0.1ML LIQD nasal spray     Sig: Use 1 spray intranasally at onset of opioid overdose symptoms. May repeat dose alternating nostril once after 4 minutes if symptoms persist or recur.      Dispense:  2 each     Refill:  1

## 2022-09-12 ENCOUNTER — OFFICE VISIT (OUTPATIENT)
Dept: INTERNAL MEDICINE CLINIC | Facility: CLINIC | Age: 77
End: 2022-09-12
Payer: MEDICARE

## 2022-09-12 VITALS
TEMPERATURE: 97 F | RESPIRATION RATE: 16 BRPM | SYSTOLIC BLOOD PRESSURE: 114 MMHG | HEIGHT: 66 IN | OXYGEN SATURATION: 96 % | DIASTOLIC BLOOD PRESSURE: 70 MMHG | BODY MASS INDEX: 19.13 KG/M2 | HEART RATE: 100 BPM | WEIGHT: 119 LBS

## 2022-09-12 DIAGNOSIS — F11.99 OPIOID USE, UNSPECIFIED WITH UNSPECIFIED OPIOID-INDUCED DISORDER (HCC): ICD-10-CM

## 2022-09-12 DIAGNOSIS — R26.81 GAIT INSTABILITY: ICD-10-CM

## 2022-09-12 DIAGNOSIS — M48.062 SPINAL STENOSIS OF LUMBAR REGION WITH NEUROGENIC CLAUDICATION: ICD-10-CM

## 2022-09-12 DIAGNOSIS — Z00.00 MEDICARE ANNUAL WELLNESS VISIT, SUBSEQUENT: Primary | ICD-10-CM

## 2022-09-12 PROCEDURE — 1123F ACP DISCUSS/DSCN MKR DOCD: CPT | Performed by: NURSE PRACTITIONER

## 2022-09-12 PROCEDURE — G0439 PPPS, SUBSEQ VISIT: HCPCS | Performed by: NURSE PRACTITIONER

## 2022-09-12 RX ORDER — TRAMADOL HYDROCHLORIDE 50 MG/1
50 TABLET ORAL 2 TIMES DAILY PRN
Qty: 60 TABLET | Refills: 0 | Status: SHIPPED | OUTPATIENT
Start: 2022-09-12 | End: 2022-10-12

## 2022-09-12 RX ORDER — TRAMADOL HYDROCHLORIDE 50 MG/1
50 TABLET ORAL 2 TIMES DAILY PRN
Qty: 60 TABLET | Refills: 0 | Status: CANCELLED | OUTPATIENT
Start: 2022-09-12 | End: 2022-09-15

## 2022-09-12 ASSESSMENT — PATIENT HEALTH QUESTIONNAIRE - PHQ9
3. TROUBLE FALLING OR STAYING ASLEEP: 0
SUM OF ALL RESPONSES TO PHQ9 QUESTIONS 1 & 2: 3
1. LITTLE INTEREST OR PLEASURE IN DOING THINGS: 1
8. MOVING OR SPEAKING SO SLOWLY THAT OTHER PEOPLE COULD HAVE NOTICED. OR THE OPPOSITE, BEING SO FIGETY OR RESTLESS THAT YOU HAVE BEEN MOVING AROUND A LOT MORE THAN USUAL: 0
10. IF YOU CHECKED OFF ANY PROBLEMS, HOW DIFFICULT HAVE THESE PROBLEMS MADE IT FOR YOU TO DO YOUR WORK, TAKE CARE OF THINGS AT HOME, OR GET ALONG WITH OTHER PEOPLE: 0
SUM OF ALL RESPONSES TO PHQ QUESTIONS 1-9: 4
SUM OF ALL RESPONSES TO PHQ QUESTIONS 1-9: 4
7. TROUBLE CONCENTRATING ON THINGS, SUCH AS READING THE NEWSPAPER OR WATCHING TELEVISION: 0
9. THOUGHTS THAT YOU WOULD BE BETTER OFF DEAD, OR OF HURTING YOURSELF: 0
2. FEELING DOWN, DEPRESSED OR HOPELESS: 2
SUM OF ALL RESPONSES TO PHQ QUESTIONS 1-9: 4
SUM OF ALL RESPONSES TO PHQ QUESTIONS 1-9: 4
5. POOR APPETITE OR OVEREATING: 0
6. FEELING BAD ABOUT YOURSELF - OR THAT YOU ARE A FAILURE OR HAVE LET YOURSELF OR YOUR FAMILY DOWN: 1
4. FEELING TIRED OR HAVING LITTLE ENERGY: 0

## 2022-09-12 ASSESSMENT — LIFESTYLE VARIABLES
HOW MANY STANDARD DRINKS CONTAINING ALCOHOL DO YOU HAVE ON A TYPICAL DAY: PATIENT DECLINED
HOW OFTEN DO YOU HAVE A DRINK CONTAINING ALCOHOL: NEVER

## 2022-09-12 NOTE — PATIENT INSTRUCTIONS
Personalized Preventive Plan for Beba Acosta - 9/12/2022  Medicare offers a range of preventive health benefits. Some of the tests and screenings are paid in full while other may be subject to a deductible, co-insurance, and/or copay. Some of these benefits include a comprehensive review of your medical history including lifestyle, illnesses that may run in your family, and various assessments and screenings as appropriate. After reviewing your medical record and screening and assessments performed today your provider may have ordered immunizations, labs, imaging, and/or referrals for you. A list of these orders (if applicable) as well as your Preventive Care list are included within your After Visit Summary for your review. Other Preventive Recommendations:    A preventive eye exam performed by an eye specialist is recommended every 1-2 years to screen for glaucoma; cataracts, macular degeneration, and other eye disorders. A preventive dental visit is recommended every 6 months. Try to get at least 150 minutes of exercise per week or 10,000 steps per day on a pedometer . Order or download the FREE \"Exercise & Physical Activity: Your Everyday Guide\" from [employer]. Call 8-855.291.8109 or search [employer] online. You need 4261-5043 mg of calcium and 9954-8231 IU of vitamin D per day. It is possible to meet your calcium requirement with diet alone, but a vitamin D supplement is usually necessary to meet this goal.  When exposed to the sun, use a sunscreen that protects against both UVA and UVB radiation with an SPF of 30 or greater. Reapply every 2 to 3 hours or after sweating, drying off with a towel, or swimming. Always wear a seat belt when traveling in a car. Always wear a helmet when riding a bicycle or motorcycle.

## 2022-09-12 NOTE — PROGRESS NOTES
Medicare Annual Wellness Visit    Tapan Prado is here for Medicare AWV    Assessment & Plan   Medicare annual wellness visit, subsequent    Spinal stenosis of lumbar region with neurogenic claudication  -     traMADol (ULTRAM) 50 MG tablet; Take 1 tablet by mouth 2 times daily as needed for Pain for up to 30 days. , Disp-60 tablet, R-0Normal  Refill of tramadol sent today. Take only as prescribed. Opioid use, unspecified with unspecified opioid-induced disorder (HCC)  -     traMADol (ULTRAM) 50 MG tablet; Take 1 tablet by mouth 2 times daily as needed for Pain for up to 30 days. , Disp-60 tablet, R-0Normal    Gait instability  -     [facility] - Physical Therapy, [facility]  Referral for PT placed for balance and gait training. Recommendations for Preventive Services Due: see orders and patient instructions/AVS.  Recommended screening schedule for the next 5-10 years is provided to the patient in written form: see Patient Instructions/AVS.     Return in about 6 weeks (around 10/24/2022) for ROUTINE FOLLOW-UP, LAB REVIEW & MEDICATION REFILL. Subjective   The following acute and/or chronic problems were also addressed today:    Patient with history of lumbar spinal stenosis. He has complaint of worsened low back pain and leg weakness over the weekend since he has been out of his tramadol. Denies any saddle anesthesia or loss of bowel/bladder control. Prior to running out of his prescription, he reports pain fairly well controlled on tramadol 50 mg BID, as well as PRN Tylenol. Patient's complete Health Risk Assessment and screening values have been reviewed and are found in Flowsheets. The following problems were reviewed today and where indicated follow up appointments were made and/or referrals ordered.     Positive Risk Factor Screenings with Interventions:    Fall Risk:  Do you feel unsteady or are you worried about falling? : (!) yes  2 or more falls in past year?: no  Fall with injury in past year?: no   Fall Risk Interventions:    Physical therapy referral ordered for strength and balance training            Opioid Risk: (Low risk score <55) Opioid risk score: 35    Patient is low risk for opioid use disorder or overdose. Last PDMP Raz Villalta as Reviewed:  Review User Review Instant Review Result   Lalito Yanez 9/12/2022  4:49 PM Reviewed PDMP [1]     Last Controlled Substance Monitoring Documentation      [address] Office Visit from 9/12/2022 in [place] [address]   Periodic Controlled Substance Monitoring Possible medication side effects, risk of tolerance/dependence & alternative treatments discussed., No signs of potential drug abuse or diversion identified. filed at 09/12/2022 [address] and ACP:  General  In general, how would you say your health is?: Fair  In the past 7 days, have you experienced any of the following: New or Increased Pain, New or Increased Fatigue, Loneliness, Social Isolation, Stress or Anger?: (!) Yes  Select all that apply: (!) New or Increased Pain  Do you get the social and emotional support that you need?: Yes  Do you have a Living Will?: Yes    Advance Directives       Power of  Living Will ACP-Advance Directive ACP-Power of     Not on File Filed on 12/25/15 Filed Not on File          General Health Risk Interventions:  Pain issues: refill sent today. Health Habits/Nutrition:  Physical Activity: Insufficiently Active    Days of Exercise per Week: 1 day    Minutes of Exercise per Session: 10 min     Have you lost any weight without trying in the past 3 months?: (!) Yes  Body mass index: 19.21     Health Habits/Nutrition Interventions:  No weight loss noted based on our in-office scale. 115 lb at 4/26/22 visit and 119 lbs today.      Hearing/Vision:  Do you or your family notice any trouble with your hearing that hasn't been managed with hearing aids?: (!) Yes  Do you have difficulty driving, watching TV, or doing any of your daily activities because of your eyesight?: No  Have you had an eye exam within the past year?: (!) No  No results found. Hearing/Vision Interventions:  Hearing concerns:  patient declines any further evaluation/treatment for hearing issues  Vision concerns:  patient declines any further evaluation/treatment for this issue    Safety:  Do you have working smoke detectors?: Yes  Do you have any tripping hazards - loose or unsecured carpets or rugs?: (!) Yes  Do you have any tripping hazards - clutter in doorways, halls, or stairs?: No  Do you have either shower bars, grab bars, non-slip mats or non-slip surfaces in your shower or bathtub?: Yes  Do all of your stairways have a railing or banister?: Yes  Safety Interventions:  Home safety tips provided           Objective   Vitals:    09/12/22 1247   BP: 114/70   Site: Left Upper Arm   Position: Sitting   Cuff Size: Medium Adult   Pulse: 100   Resp: 16   Temp: 97 °F (36.1 °C)   TempSrc: Temporal   SpO2: 96%   Weight: 119 lb (54 kg)   Height: 5' 6\" (1.676 m)      Body mass index is 19.21 kg/m². Physical Exam  Vitals and nursing note reviewed. Constitutional:       General: He is not in acute distress. Appearance: Normal appearance. HENT:      Right Ear: Tympanic membrane, ear canal and external ear normal.      Left Ear: Tympanic membrane, ear canal and external ear normal.      Nose: Nose normal.      Mouth/Throat:      Mouth: Mucous membranes are moist.      Pharynx: Oropharynx is clear. Eyes:      Extraocular Movements: Extraocular movements intact. Pupils: Pupils are equal, round, and reactive to light. Cardiovascular:      Rate and Rhythm: Regular rhythm. Heart sounds: Normal heart sounds. Pulmonary:      Effort: Pulmonary effort is normal. No respiratory distress. Breath sounds: Normal breath sounds. Abdominal:      General: Bowel sounds are normal.      Palpations: Abdomen is soft.       Tenderness: There is no abdominal tenderness. Musculoskeletal:      Lumbar back: Decreased range of motion. Right lower leg: No edema. Left lower leg: No edema. Comments: Ambulating with cane   Skin:     General: Skin is warm and dry. Neurological:      Mental Status: He is alert and oriented to person, place, and time. Cranial Nerves: No cranial nerve deficit. Gait: Gait abnormal.   Psychiatric:         Mood and Affect: Mood normal.            Allergies   Allergen Reactions    Latex Rash     Per Wife     Prior to Visit Medications    Medication Sig Taking? Authorizing Provider   traMADol (ULTRAM) 50 MG tablet Take 1 tablet by mouth 2 times daily as needed for Pain for up to 30 days. Yes LEIDA Collazo - CNP   naloxone [facility]) 4 MG/0.1ML LIQD nasal spray Use 1 spray intranasally at onset of opioid overdose symptoms. May repeat dose alternating nostril once after 4 minutes if symptoms persist or recur. Patient taking differently: Use 1 spray intranasally at onset of opioid overdose symptoms. May repeat dose alternating nostril once after 4 minutes if symptoms persist or recur. Yes Tiago Arias DO   acetaminophen (TYLENOL) 650 MG extended release tablet Take 500 mg by mouth as needed Yes Ar Automatic Reconciliation   albuterol sulfate  (90 Base) MCG/ACT inhaler Inhale 2 puffs into the lungs every 6 hours as needed Yes Ar Automatic Reconciliation   calcium citrate-vitamin D (CITRICAL + D) 315-250 MG-UNIT TABS per tablet Take 1 tablet by mouth daily Yes Ar Automatic Reconciliation   carvedilol (COREG) 3.125 MG tablet Take 3.125 mg by mouth 2 times daily (with meals) Yes Ar Automatic Reconciliation   vitamin D (CHOLECALCIFEROL) 25 MCG (1000 UT) TABS tablet Take 5,000 Units by mouth Yes Ar Automatic Reconciliation   clonazePAM (KLONOPIN) 1 MG tablet Take 1 mg by mouth 2 times daily.  Yes Ar Automatic Reconciliation   escitalopram (LEXAPRO) 20 MG tablet Take 20 mg by mouth daily Yes Ar Automatic Reconciliation   fluticasone (FLONASE) 50 MCG/ACT nasal spray 2 sprays by Nasal route daily Yes Ar Automatic Reconciliation   fluticasone-umeclidin-vilant (TRELEGY ELLIPTA) 100-62.5-25 MCG/INH AEPB Inhale 1 puff into the lungs daily Yes Ar Automatic Reconciliation   ipratropium-albuterol (DUONEB) 0.5-2.5 (3) MG/3ML SOLN nebulizer solution Inhale 3 mLs into the lungs every 6 hours as needed Yes Ar Automatic Reconciliation   loratadine (CLARITIN) 10 MG tablet Take 10 mg by mouth as needed Yes Ar Automatic Reconciliation   montelukast (SINGULAIR) 10 MG tablet Take 10 mg by mouth daily Yes Ar Automatic Reconciliation   nitroGLYCERIN (NITROSTAT) 0.4 MG SL tablet Place 0.4 mg under the tongue Yes Ar Automatic Reconciliation   Polyvinyl Alcohol-Povidone 2.7-2 % SOLN Apply 2 drops to eye daily Yes Ar Automatic Reconciliation   QUEtiapine (SEROQUEL) 100 MG tablet Take 100 mg by mouth Yes Ar Automatic Reconciliation   rosuvastatin (CRESTOR) 20 MG tablet Take 20 mg by mouth Yes Ar Automatic Reconciliation   Sennosides 8.6 MG CAPS Take 8.6 mg by mouth daily as needed Yes Ar Automatic Reconciliation       CareTeam (Including outside providers/suppliers regularly involved in providing care):   Patient Care Team:  Damion Adler PA-C as PCP - [facility]  Damion Adler PA-C as PCP - [facility] Empaneled Provider  Danny Palacios MD as Physician  Renetta Khan MD as Physician     Reviewed and updated this visit:  Tobacco  Allergies  Meds  Problems  Med Hx  Surg Hx  Soc Hx  Fam Hx

## 2022-09-12 NOTE — TELEPHONE ENCOUNTER
Pt requesting his full 1 month refill for Tramadol, please. Scripts last fill date: 9/9/22 for a 3 day supply from Dr. Dylan Amor. Pended for pick-up today.

## 2022-09-16 ENCOUNTER — OFFICE VISIT (OUTPATIENT)
Dept: VASCULAR SURGERY | Age: 77
End: 2022-09-16
Payer: MEDICARE

## 2022-09-16 VITALS
TEMPERATURE: 97.4 F | HEART RATE: 96 BPM | BODY MASS INDEX: 18.96 KG/M2 | WEIGHT: 118 LBS | HEIGHT: 66 IN | DIASTOLIC BLOOD PRESSURE: 76 MMHG | SYSTOLIC BLOOD PRESSURE: 120 MMHG | OXYGEN SATURATION: 98 %

## 2022-09-16 DIAGNOSIS — I71.40 AAA (ABDOMINAL AORTIC ANEURYSM) WITHOUT RUPTURE: Primary | ICD-10-CM

## 2022-09-16 PROCEDURE — 99213 OFFICE O/P EST LOW 20 MIN: CPT | Performed by: SURGERY

## 2022-09-16 PROCEDURE — 1123F ACP DISCUSS/DSCN MKR DOCD: CPT | Performed by: SURGERY

## 2022-09-16 PROCEDURE — G8420 CALC BMI NORM PARAMETERS: HCPCS | Performed by: SURGERY

## 2022-09-16 PROCEDURE — 1036F TOBACCO NON-USER: CPT | Performed by: SURGERY

## 2022-09-16 PROCEDURE — G8428 CUR MEDS NOT DOCUMENT: HCPCS | Performed by: SURGERY

## 2022-09-16 ASSESSMENT — PATIENT HEALTH QUESTIONNAIRE - PHQ9
2. FEELING DOWN, DEPRESSED OR HOPELESS: 0
1. LITTLE INTEREST OR PLEASURE IN DOING THINGS: 0
SUM OF ALL RESPONSES TO PHQ QUESTIONS 1-9: 0
SUM OF ALL RESPONSES TO PHQ9 QUESTIONS 1 & 2: 0
SUM OF ALL RESPONSES TO PHQ QUESTIONS 1-9: 0

## 2022-09-16 NOTE — PROGRESS NOTES
[address]   [address] [facility] FAX: [phone]    Tapan Prado  : 1945    Chief Complaint:     History of Present Illness:   Patient follows up today for follow-up for aortic surveillance. Patient denies abdominal pain or back pain. CURRENT MEDICATIONS:  Current Outpatient Medications   Medication Sig Dispense Refill    traMADol (ULTRAM) 50 MG tablet Take 1 tablet by mouth 2 times daily as needed for Pain for up to 30 days. 60 tablet 0    naloxone (NARCAN) 4 MG/0.1ML LIQD nasal spray Use 1 spray intranasally at onset of opioid overdose symptoms. May repeat dose alternating nostril once after 4 minutes if symptoms persist or recur. (Patient taking differently: Use 1 spray intranasally at onset of opioid overdose symptoms. May repeat dose alternating nostril once after 4 minutes if symptoms persist or recur.) 2 each 1    acetaminophen (TYLENOL) 650 MG extended release tablet Take 500 mg by mouth as needed      albuterol sulfate  (90 Base) MCG/ACT inhaler Inhale 2 puffs into the lungs every 6 hours as needed      calcium citrate-vitamin D (CITRICAL + D) 315-250 MG-UNIT TABS per tablet Take 1 tablet by mouth daily      carvedilol (COREG) 3.125 MG tablet Take 3.125 mg by mouth 2 times daily (with meals)      vitamin D (CHOLECALCIFEROL) 25 MCG (1000 UT) TABS tablet Take 5,000 Units by mouth      clonazePAM (KLONOPIN) 1 MG tablet Take 1 mg by mouth 2 times daily.       escitalopram (LEXAPRO) 20 MG tablet Take 20 mg by mouth daily      fluticasone (FLONASE) 50 MCG/ACT nasal spray 2 sprays by Nasal route daily      fluticasone-umeclidin-vilant (TRELEGY ELLIPTA) 100-62.5-25 MCG/INH AEPB Inhale 1 puff into the lungs daily      ipratropium-albuterol (DUONEB) 0.5-2.5 (3) MG/3ML SOLN nebulizer solution Inhale 3 mLs into the lungs every 6 hours as needed      loratadine (CLARITIN) 10 MG tablet Take 10 mg by mouth as needed      montelukast (SINGULAIR) 10 MG tablet Take 10 mg by mouth daily      nitroGLYCERIN (NITROSTAT) 0.4 MG SL tablet Place 0.4 mg under the tongue      Polyvinyl Alcohol-Povidone 2.7-2 % SOLN Apply 2 drops to eye daily      QUEtiapine (SEROQUEL) 100 MG tablet Take 100 mg by mouth      rosuvastatin (CRESTOR) 20 MG tablet Take 20 mg by mouth      Sennosides 8.6 MG CAPS Take 8.6 mg by mouth daily as needed       No current facility-administered medications for this visit. Past Medical History:   Diagnosis Date    [facility] 01/20/2020    4.2 cm Infrarenal    CAD (coronary artery disease)     2005 RCA- PACLITAXEL ELUTING    Chronic heart failure with reduced ejection fraction and diastolic dysfunction (HCC)     Chronic obstructive pulmonary disease (HCC)     Depression     Generalized anxiety disorder 11/25/2014    History of basal cell cancer     x 6    Hyperlipidemia LDL goal < 70     Insomnia secondary to depression with anxiety 11/25/2014    Lumbar herniated disc 10/17/13    Lumbar spinal stenosis     Per MRI    Nocturnal hypoxemia 12/05/2017    Lowest SpO2 - 86%    Osteoporosis     Perennial allergic rhinitis with seasonal variation 11/25/2014    Right inguinal hernia 02/14/2022    Squamous cell carcinoma 12/2018    L Face & L Forearm    Subarachnoid hematoma ([facility] 07/08/2014    Secondary to Fall    T12 vertebral fracture ([facility] 1/25/15    Tobacco use disorder     Vitamin D deficiency 11/25/2014       Physical Examination:   Height: 5' 6\" (1.676 m), Weight: 118 lb (53.5 kg), BP: 120/76    Constitutional: he appears well-developed. No distress. HENT:   Head: Atraumatic. Eyes: Pupils are equal, round, and reactive to light. Neck: Normal range of motion. Cardiovascular: Regular rhythm. Pulmonary/Chest: Effort normal and breath sounds normal. No respiratory distress. Abdominal: Soft. Bowel sounds are normal. he exhibits no distension. There is no tenderness. There is no guarding. No hernia.    Musculoskeletal: Normal range of motion. Neurological: He is alert. CN II- XII grossly intact   Vascular: No carotid bruits    Imagin.5 cm abdominal aortic aneurysm      Recommendations/Plans:   Mr. Nirav Howard is a 67y.o. year old male 4.5 cm down aortic aneurysm unchanged. Follow-up in 6 months with a duplex study. Ruddy Mota MD    Elements of this note have been dictated using speech recognition software. As a result, errors of speech recognition may have occurred.     20 minutes of time was spent on this encounter including chart review, assessment, evaluation and coordination of patient care

## 2022-09-26 DIAGNOSIS — E78.5 HYPERLIPIDEMIA WITH TARGET LDL LESS THAN 70: Primary | ICD-10-CM

## 2022-09-26 RX ORDER — ROSUVASTATIN CALCIUM 20 MG/1
20 TABLET, COATED ORAL NIGHTLY
Qty: 90 TABLET | Refills: 0 | Status: SHIPPED | OUTPATIENT
Start: 2022-09-26 | End: 2022-10-27 | Stop reason: SDUPTHER

## 2022-09-27 NOTE — TELEPHONE ENCOUNTER
Sent in a 90 day supply which will get him through until his scheduled appt. Will need to keep that appointment to get any further refills.

## 2022-10-03 DIAGNOSIS — E78.5 HYPERLIPIDEMIA WITH TARGET LDL LESS THAN 70: ICD-10-CM

## 2022-10-03 RX ORDER — ROSUVASTATIN CALCIUM 20 MG/1
20 TABLET, COATED ORAL NIGHTLY
Qty: 90 TABLET | Refills: 0 | OUTPATIENT
Start: 2022-10-03

## 2022-10-27 ENCOUNTER — OFFICE VISIT (OUTPATIENT)
Dept: INTERNAL MEDICINE CLINIC | Facility: CLINIC | Age: 77
End: 2022-10-27
Payer: MEDICARE

## 2022-10-27 VITALS
WEIGHT: 119 LBS | BODY MASS INDEX: 19.13 KG/M2 | TEMPERATURE: 98.6 F | HEART RATE: 96 BPM | HEIGHT: 66 IN | SYSTOLIC BLOOD PRESSURE: 120 MMHG | DIASTOLIC BLOOD PRESSURE: 80 MMHG | OXYGEN SATURATION: 98 %

## 2022-10-27 DIAGNOSIS — Z12.5 SCREENING FOR PROSTATE CANCER: ICD-10-CM

## 2022-10-27 DIAGNOSIS — F11.99 OPIOID USE, UNSPECIFIED WITH UNSPECIFIED OPIOID-INDUCED DISORDER (HCC): ICD-10-CM

## 2022-10-27 DIAGNOSIS — E55.9 VITAMIN D DEFICIENCY: ICD-10-CM

## 2022-10-27 DIAGNOSIS — I25.10 ATHEROSCLEROSIS OF NATIVE CORONARY ARTERY OF NATIVE HEART WITHOUT ANGINA PECTORIS: ICD-10-CM

## 2022-10-27 DIAGNOSIS — F39 MOOD DISORDER (HCC): ICD-10-CM

## 2022-10-27 DIAGNOSIS — Z91.81 AT HIGH RISK FOR FALLS: ICD-10-CM

## 2022-10-27 DIAGNOSIS — M48.062 SPINAL STENOSIS OF LUMBAR REGION WITH NEUROGENIC CLAUDICATION: ICD-10-CM

## 2022-10-27 DIAGNOSIS — F41.1 GENERALIZED ANXIETY DISORDER: ICD-10-CM

## 2022-10-27 DIAGNOSIS — E78.5 HYPERLIPIDEMIA WITH TARGET LDL LESS THAN 70: Primary | ICD-10-CM

## 2022-10-27 DIAGNOSIS — R26.81 GAIT INSTABILITY: ICD-10-CM

## 2022-10-27 PROBLEM — I50.42 CHRONIC COMBINED SYSTOLIC (CONGESTIVE) AND DIASTOLIC (CONGESTIVE) HEART FAILURE (HCC): Status: ACTIVE | Noted: 2022-10-27

## 2022-10-27 PROBLEM — F33.1 MAJOR DEPRESSIVE DISORDER, RECURRENT, MODERATE (HCC): Status: ACTIVE | Noted: 2022-10-27

## 2022-10-27 PROCEDURE — 99214 OFFICE O/P EST MOD 30 MIN: CPT | Performed by: PHYSICIAN ASSISTANT

## 2022-10-27 PROCEDURE — G8427 DOCREV CUR MEDS BY ELIG CLIN: HCPCS | Performed by: PHYSICIAN ASSISTANT

## 2022-10-27 PROCEDURE — 1123F ACP DISCUSS/DSCN MKR DOCD: CPT | Performed by: PHYSICIAN ASSISTANT

## 2022-10-27 PROCEDURE — G8484 FLU IMMUNIZE NO ADMIN: HCPCS | Performed by: PHYSICIAN ASSISTANT

## 2022-10-27 PROCEDURE — G8420 CALC BMI NORM PARAMETERS: HCPCS | Performed by: PHYSICIAN ASSISTANT

## 2022-10-27 PROCEDURE — 3074F SYST BP LT 130 MM HG: CPT | Performed by: PHYSICIAN ASSISTANT

## 2022-10-27 PROCEDURE — 1036F TOBACCO NON-USER: CPT | Performed by: PHYSICIAN ASSISTANT

## 2022-10-27 PROCEDURE — 3078F DIAST BP <80 MM HG: CPT | Performed by: PHYSICIAN ASSISTANT

## 2022-10-27 RX ORDER — ZOLPIDEM TARTRATE 10 MG/1
TABLET ORAL
COMMUNITY
Start: 2022-10-03

## 2022-10-27 RX ORDER — ROSUVASTATIN CALCIUM 20 MG/1
20 TABLET, COATED ORAL NIGHTLY
Qty: 90 TABLET | Refills: 1 | Status: SHIPPED | OUTPATIENT
Start: 2022-10-27

## 2022-10-27 RX ORDER — TRAMADOL HYDROCHLORIDE 50 MG/1
50 TABLET ORAL 2 TIMES DAILY
Qty: 60 TABLET | Refills: 2 | Status: SHIPPED | OUTPATIENT
Start: 2022-10-27 | End: 2023-01-25

## 2022-10-27 RX ORDER — TRAMADOL HYDROCHLORIDE 50 MG/1
TABLET ORAL
COMMUNITY
Start: 2022-10-21 | End: 2022-10-27 | Stop reason: SDUPTHER

## 2022-10-27 RX ORDER — ESCITALOPRAM OXALATE 20 MG/1
20 TABLET ORAL DAILY
Qty: 90 TABLET | Refills: 3 | Status: SHIPPED | OUTPATIENT
Start: 2022-10-27

## 2022-10-27 ASSESSMENT — PATIENT HEALTH QUESTIONNAIRE - PHQ9
SUM OF ALL RESPONSES TO PHQ9 QUESTIONS 1 & 2: 0
SUM OF ALL RESPONSES TO PHQ QUESTIONS 1-9: 0
SUM OF ALL RESPONSES TO PHQ QUESTIONS 1-9: 0
1. LITTLE INTEREST OR PLEASURE IN DOING THINGS: 0
SUM OF ALL RESPONSES TO PHQ QUESTIONS 1-9: 0
2. FEELING DOWN, DEPRESSED OR HOPELESS: 0
SUM OF ALL RESPONSES TO PHQ QUESTIONS 1-9: 0

## 2022-10-27 ASSESSMENT — ENCOUNTER SYMPTOMS
BACK PAIN: 1
SHORTNESS OF BREATH: 1

## 2022-10-27 NOTE — PATIENT INSTRUCTIONS
Resume OTC vitamin d3 5000 iu daily + OTC calcium citrate + d3 x 2 bid (630 mg of calcium w/ 500 iu of vitamin d3 in 2 tablets)  Resume Lexapro 20 mg daily - start back with 10 mg (1/2 of 20 mg tablet) daily x 2 weeks then increase to full tablet daily for maintenance  Encouraged to work towards a safter home environment with a single level home if not willing to consider assisted living facility  Consider a rib brace for the next couple weeks to provide some stability and pain relief from rib fractures  Reminded of the importance fo tstay well hydrated with plenty of water  Recommend annual flu vaccination but unfortunately we are out of flu shots currently so he will need to obtain from local pharmacy  Recommend getting newest COVID vaccine given formulation that should better protect against the most recently circulating Omicron variant

## 2022-10-27 NOTE — PROGRESS NOTES
Ave Lynn (:  1945) is a 67 y.o. male,Established patient, here for evaluation of the following chief complaint(s):  Fall (~ 2 weeks ago - broken ribs), Medication Refill, and Follow-up (Hyperlipidemia, CAD, Vit D Def)         ASSESSMENT/PLAN:  1. Hyperlipidemia with target LDL less than 70  -     rosuvastatin (CRESTOR) 20 MG tablet; Take 1 tablet by mouth at bedtime, Disp-90 tablet, R-1Normal  -     CBC with Auto Differential; Future  -     Comprehensive Metabolic Panel; Future  -     Lipid Panel; Future  2. At high risk for falls  3. Spinal stenosis of lumbar region with neurogenic claudication  -     traMADol (ULTRAM) 50 MG tablet; Take 1 tablet by mouth 2 times daily for 90 days. , Disp-60 tablet, R-2Normal  4. Opioid use, unspecified with unspecified opioid-induced disorder (HCC)  -     traMADol (ULTRAM) 50 MG tablet; Take 1 tablet by mouth 2 times daily for 90 days. , Disp-60 tablet, R-2Normal  5. Gait instability  6. Generalized anxiety disorder  -     escitalopram (LEXAPRO) 20 MG tablet; Take 1 tablet by mouth daily, Disp-90 tablet, R-3Normal  7. Mood disorder (HCC)  -     escitalopram (LEXAPRO) 20 MG tablet; Take 1 tablet by mouth daily, Disp-90 tablet, R-3Normal  8. Atherosclerosis of native coronary artery of native heart without angina pectoris  -     Lipid Panel; Future  9. Vitamin D deficiency  -     Vitamin D 25 Hydroxy; Future  10. Screening for prostate cancer  -     PSA Screening;  Future      Patient Instructions   Resume OTC vitamin d3 5000 iu daily + OTC calcium citrate + d3 x 2 bid (630 mg of calcium w/ 500 iu of vitamin d3 in 2 tablets)  Resume Lexapro 20 mg daily - start back with 10 mg (1/2 of 20 mg tablet) daily x 2 weeks then increase to full tablet daily for maintenance  Encouraged to work towards a safter home environment with a single level home if not willing to consider assisted living facility  Consider a rib brace for the next couple weeks to provide some stability and pain relief from rib fractures  Reminded of the importance fo tstay well hydrated with plenty of water  Recommend annual flu vaccination but unfortunately we are out of flu shots currently so he will need to obtain from local pharmacy  Recommend getting newest COVID vaccine given formulation that should better protect against the most recently circulating Omicron variant      Return in 3 months (on 1/27/2023) for routine f/u. Subjective   SUBJECTIVE/OBJECTIVE:  The patient is a 67 y.o. male who is seen for evaluation of hyperlipidemia. He was tested because of hyperlipidemia w/ LDL goal < 70 + CAD. The current state of this condition is control uncertain and no significant medication side effects noted on Crestor 20 mg q hs - taking as prescribed. Last Lipid Panel:   Lab Results   Component Value Date    CHOL 120 07/26/2022    CHOL 132 04/11/2022    CHOL 125 07/09/2021     Lab Results   Component Value Date    TRIG 90 07/26/2022    TRIG 97 04/11/2022    TRIG 75 07/09/2021     Lab Results   Component Value Date    HDL 70 (H) 07/26/2022    HDL 52 04/11/2022    HDL 52 07/09/2021     Lab Results   Component Value Date    LDLCALC 32 07/26/2022    LDLCALC 62 04/11/2022    LDLCALC 58 07/09/2021     Lab Results   Component Value Date    LABVLDL 18 07/26/2022    LABVLDL 18 07/06/2020    LABVLDL 20 10/11/2019    VLDL 18 04/11/2022    VLDL 15 07/09/2021    VLDL 17 03/11/2021     Lab Results   Component Value Date    CHOLHDLRATIO 1.7 07/26/2022       Patient is here for follow-up of vitamin d deficiency. The current state of this condition is poorly controlled - not currently on medications for this problem, medication compliance problems: wife used to keep up with his meds and she has been sick for the past year, previously on OTC vitamin d3 5000 iu daily + OTC calcium citrate + d3 x 2 bid (630 mg of calcium w/ 500 iu of vitamin d3 in 2 tablets).       Vit D, 25-Hydroxy   Date Value Ref Range Status 07/26/2022 29.3 (L) 30.0 - 100.0 ng/mL Final   04/11/2022 48.9 30.0 - 100.0 ng/mL Final     Comment:     Vitamin D deficiency has been defined by the [address] practice guideline as a  level of serum 25-OH vitamin D less than 20 ng/mL (1,2). The Endocrine Society went on to further define vitamin D  insufficiency as a level between 21 and 29 ng/mL (2). 1. IOM ([place] of [employer]). 2010. Dietary reference     intakes for calcium and D. [facility]: The     [place]. 2. Dean Brantley, Kaleb CORTES, et al.     Evaluation, treatment, and prevention of vitamin D     deficiency: an Endocrine Society clinical practice     guideline. JCEM. 2011 Jul; 96(7):1911-30.     07/09/2021 63.9 30.0 - 100.0 ng/mL Final     Comment:     Vitamin D deficiency has been defined by the [address] practice guideline as a  level of serum 25-OH vitamin D less than 20 ng/mL (1,2). The Endocrine Society went on to further define vitamin D  insufficiency as a level between 21 and 29 ng/mL (2). 1. IOM ([place] [employer]). 2010. Dietary reference     intakes for calcium and D. [facility]: The     [place]. 2. Dean Brantley, Kaleb CORTES, et al.     Evaluation, treatment, and prevention of vitamin D     deficiency: an Endocrine Society clinical practice     guideline. JCEM. 2011 Jul; 96(7):1911-30. Patient is here for follow-up of CAD. The current state of this condition is asymptomatic, no significant medication side effects noted, and stable on Carvedilol 3.125 mg bid + OTC aspirin 81 mg daily - taking as prescribed. Patient is here for follow-up of lumbar spinal stenosis. The current state of this condition is no significant medication side effects noted and poorly controlled on Tramadol 50 mg bid + Extra Strength Tylenol bid - taking as prescribed.   Patient missed his routine appointment and therefore has been out of Tramadol at times. He equates his lower extremity weakness to the times he is out of Tramadol. During one of this times, he suffered a fall landing on oxygen tanks which resulted in some rib fractures a couple weeks ago and was seen and treated at [facility] on [facility]. Patient is here for follow-up of anxiety with mood disorder. The current state of this condition is poorly controlled - not currently on medications for this problem, medication compliance problems: wife previously kept up with his meds and she has been sick for the past year so he didn't realize he was off of it. Patient admits to been easily agitated and sage which caregiver supports. Review of Systems   Constitutional:  Negative for fatigue and unexpected weight change. Respiratory:  Positive for shortness of breath (occasional with moderate exertion). Cardiovascular:  Negative for chest pain, palpitations and leg swelling. Endocrine:        Negative for hair loss   Musculoskeletal:  Positive for back pain. Negative for myalgias. Neurological:  Positive for weakness (lower extremity). Psychiatric/Behavioral:  Positive for agitation, dysphoric mood and sleep disturbance. Negative for suicidal ideas. The patient is nervous/anxious. /80 (Site: Left Upper Arm, Position: Sitting, Cuff Size: Large Adult)   Pulse 96   Temp 98.6 °F (37 °C) (Temporal)   Ht 5' 6\" (1.676 m)   Wt 119 lb (54 kg)   SpO2 98%   BMI 19.21 kg/m²       Objective   Physical Exam  Constitutional:       Appearance: Normal appearance. HENT:      Head: Normocephalic and atraumatic. Eyes:      Conjunctiva/sclera: Conjunctivae normal.      Pupils: Pupils are equal, round, and reactive to light. Neck:      Vascular: No carotid bruit. Cardiovascular:      Rate and Rhythm: Normal rate and regular rhythm. Heart sounds: Normal heart sounds.    Pulmonary:      Effort: Pulmonary effort is normal.      Breath sounds: Normal breath sounds. Musculoskeletal:         General: Normal range of motion. Cervical back: Normal range of motion. Skin:     General: Skin is warm and dry. Neurological:      Mental Status: He is alert and oriented to person, place, and time. Psychiatric:         Mood and Affect: Mood normal.         Behavior: Behavior normal.         Thought Content: Thought content normal.         Judgment: Judgment normal.          On this date 10/27/2022 I have spent 30 minutes reviewing previous notes, test results and face to face with the patient discussing the diagnosis and importance of compliance with the treatment plan as well as documenting on the day of the visit. An electronic signature was used to authenticate this note. --Kodak Celaya PA-C       On the basis of positive falls risk screening, assessment and plan is as follows: patient declines any further evaluation/treatment for increased falls risk stating he is not weak or falling as long as he is on Tramadol. Previously referred to physical therapy multiple times but patient never follows through.

## 2022-11-03 RX ORDER — MONTELUKAST SODIUM 10 MG/1
TABLET ORAL
Qty: 90 TABLET | Refills: 0 | OUTPATIENT
Start: 2022-11-03

## 2023-01-09 ENCOUNTER — TELEPHONE (OUTPATIENT)
Dept: INTERNAL MEDICINE CLINIC | Facility: CLINIC | Age: 78
End: 2023-01-09

## 2023-01-09 NOTE — TELEPHONE ENCOUNTER
Pt called to cancel labs and upcoming appointment.  He will call back to reschedule    Thank you    Maryellen Doran

## 2023-01-30 RX ORDER — MONTELUKAST SODIUM 10 MG/1
TABLET ORAL
Qty: 90 TABLET | Refills: 0 | OUTPATIENT
Start: 2023-01-30